# Patient Record
Sex: MALE | Race: WHITE | Employment: FULL TIME | URBAN - METROPOLITAN AREA
[De-identification: names, ages, dates, MRNs, and addresses within clinical notes are randomized per-mention and may not be internally consistent; named-entity substitution may affect disease eponyms.]

---

## 2018-06-18 ENCOUNTER — TRANSFERRED RECORDS (OUTPATIENT)
Dept: HEALTH INFORMATION MANAGEMENT | Facility: CLINIC | Age: 18
End: 2018-06-18

## 2018-08-09 ENCOUNTER — DOCUMENTATION ONLY (OUTPATIENT)
Dept: TRANSPLANT | Facility: CLINIC | Age: 18
End: 2018-08-09

## 2018-08-09 DIAGNOSIS — Z84.81 FAMILY HISTORY OF CARRIER OF GENETIC DISEASE: ICD-10-CM

## 2018-08-09 DIAGNOSIS — Z84.81 FAMILY HISTORY OF CARRIER OF GENETIC DISEASE: Primary | ICD-10-CM

## 2018-08-09 LAB — MISCELLANEOUS TEST: NORMAL

## 2018-08-09 NOTE — TELEPHONE ENCOUNTER
"It was a pleasure meeting with Rommel during his sister, Seema's, visit in the [facility] on August 9, 2018 to review Seema's genetic testing and discuss recommendations for follow up genetic studies for Seema's family members.    Family History:  A family history was not obtained today; however, Rommel's parents previously provided written permission to use Seema's test results and family history report for Rommel's care. Seema has also provided verbal assent for the use of her information.     Discussion:  We reviewed Seema's test results and the genetics and inheritance of FA-A. 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 Parental testing is needed to confirm that these variants are on separate copies of the FANCA gene (i.e. In trans).    Based on the autosomal recessive inheritance of FA-A, Rommel has a 1/4 (25%) chance of also having Fanconi anemia. Based on this information, testing is recommended to determine if Rommel has FA. This is in keeping with the recommendations for testing of full siblings provided in the \"Fanconi Anemia: Guidelines for Diagnosis and Management, Fourth Edition.\" Testing can be completed in one of two ways:    Chromosome Breakage Testing:     Chromosome breakage testing is the standard method for determining if someone has FA. We reviewed how this testing would determine if Rommel has FA or does not have FA. This testing would not determine if Rommel is a carrier for FA. We reviewed that Rommel's insurance does not have out-of-state benefits and that his insurance has indicated that testing would need to be completed at home for in-network benefits. They did say we could consider submitting a prior authorization, " however, testing (if approved) would be considered out-of-network and it is unclear what the out of pocket cost would be. We reviewed how I could write supporting information for Rommel to complete this testing locally and could be a supportive contact for his local provider when ordering testing, if this would be helpful. Alternatively, testing could be sent from our facility but we cannot provide the out of pocket cost. At this time, Rommel shared that chromosome breakage testing would not be his first choice for testing.    Genetic Testing:     We reviewed that genetic testing for Rommel would look for the two variants that were previously identified in Seema and are expected to be responsible for her diagnosis with FA. We reviewed how parental testing to verify that Seema's variants are on separate copies of the FANCA gene (i.e. In trans) would be preferable before completing testing. Testing in this method would (1) confirm Seema's results if parental testing confirms these variants are in trans, (2) determine if Rommel carries both FANCA gene variants which would lead to a diagnosis with FA, and (3) determine if Rommel is a carrier of a single FANCA gene variant meaning he is a carrier for FA or if he does not carry either familial variant. Rommel is aware that he currently has a 1/4 (25%) chance of having FA, a 1/2 (50%) chance of being a carrier for FA, and a 1/4 (25%) chance of not having FA and not being a carrier for the condition. Rommel is also aware that, if genetic testing determines that he has FA, Rommel would need to be followed and managed long term based on his underlying diagnosis.     Testing through our in-house laboratory would not be covered by insurance and as testing would be $1844 per individual at our institution, we discussed sending testing to [facility]. [facility] is a genetic testing laboratory and would perform full sequencing and deletion/duplication analysis of the FANCA gene for  Rommel's parents and for Rommel to determine carrier status. They can evaluate insurance coverage for testing on the family's behalf and call with the out of pocket cost. We also discussed how, if testing is not covered, there is a patient pay option of $250 per test. Alternatively, there is a reduced pay price list for patients submitting to insurance based on income. At the end of our discussion, Rommel and his parents consented to genetic testing through [facility].    Plan:  1. Rommel provided [facility] saliva [facility] today [facility] Rommel and his parents will be contacted directly by the laboratory regarding their expected out of pocket costs. The family will make a decision regarding coverage. If testing is not sufficiently covered, Rommel plans to contact me so I can help coordinate testing locally. If testing is completed through [facility], Rommel will be contacted by phone with results when available.  2. Rommel signed a release today permitting email communication of PHI and releasing his records from our institution to his local provider to assist with testing at home, as needed.  3. Contact information was provided. Additional questions or concerns were denied.    Sincerely,    Maria Dolores Huynh MS, [facility]  Certified Genetic Counselor  Pediatric Blood & Marrow Transplant  [phone]  [email or web address]    Approximate time spent in consultation: 40 minutes

## 2018-08-23 ENCOUNTER — TELEPHONE (OUTPATIENT)
Dept: TRANSPLANT | Facility: CLINIC | Age: 18
End: 2018-08-23

## 2018-08-30 NOTE — TELEPHONE ENCOUNTER
August 23, 2018    I called and spoke with Rommel along with his father, Nima, sharing that we have an update on insurance coverage for testing through [facility]. [facility] notified me that they were mistaken previously as the family's insurance is one of the only [facility] policies that is not in-network with their laboratory. Based on this information, they do not do pre-verification of benefits before testing; however, the laboratory reports the family should not be charged more than $100 out of pocket per test. Rommel expressed comfort with this plan and asked that we proceed with testing. The family will be contacted with results once available.    All questions answered. Additional concerns were denied.    Sincerely,    Maria Dolores Huynh MS, [facility]  Certified Genetic Counselor  [phone]  [email or web address]

## 2018-09-10 ENCOUNTER — TELEPHONE (OUTPATIENT)
Dept: TRANSPLANT | Facility: CLINIC | Age: 18
End: 2018-09-10

## 2018-09-11 NOTE — TELEPHONE ENCOUNTER
"September 10, 2018    I called with Dr. Jeovanny Stinson to review the results of Rommel's genetic testing. A voice message was left requesting a call back.    I called Rommel later in the day and spoke with Rommel regarding his genetic testing results from his testing through [facility]. We reviewed that he was found to have both of the FANCA mutations that were identified in his sister. In other words, Rommel is compound heterozygous for the c.2738A>C (p.[id number]) variant and the deletion of exons 1-14 in the FANCA gene. The testing laboratory, [facility], interprets both variants as \"pathogenic\" or disease-causing. We reviewed that these results are consistent with Rommel having Fanconi anemia (FA).     Rommel shared that this result was not what he expected. Rommel shared that he has been healthy overall and is surprised that he could have FA given his health history. We reviewed how the variant called p.[id number] has been seen in patients with a milder presentation of their FA symptoms (i.e. fewer congenital anomalies and later onset of hematologic symptoms) compared to the typical FA population and that this may help explain his current age and reportedly good health history. We also reviewed how different people with FA can present with different symptoms at different ages, even within families with the same genetic cause of their FA. Based on these results, we reviewed the importance of establishing a plan for screening and management for Rommel moving forward. We reviewed how this could be completed through our institution or through a local provider and that Dr. Stinson will be calling Rommel to discuss this process and timeline further. Rommel shared that this information is a lot to take in and asked that I call and review his results with his mother, Amy (Davida), so that she has a clear understanding of his results. We also made a plan to speak further next week to review Rommel's results in " greater detail when he has had more time to process his new diagnosis.    Finally, we discussed how important it is to have support after receiving this kind of news. I asked Rommel who in his personal life he can reach out to for support and he shared that he would call and speak with his parents. Rommel plans to reach out to his mother first and then I will call shortly after to discuss the results further with Davida. I shared that Rommel can also call or email me in the coming days if he has any questions and concerns and that I could also provide other support resources if this would be helpful. Rommel is also aware that he will be hearing from Dr. Stinson in the next day or two to discuss recommendations for medical follow up for Rommel at this point. Rommel expressed understanding and looks forward to that call.    We also discussed sending a results letter along with a copy of Rommel's results to Rommel directly for his review. We discussed how it would be helpful if we can share his test results and results letter with his local provider so he/she is aware of his diagnosis and can be involved in his care plans. Rommel shared that he has a primary care provider but he is not aware of his name. We will review this information again when we have a more detailed discussion next week.    All questions answered. Rommel shares that he still have my contact information. Additional concerns were denied.    Maria Dolores Huynh MS, [facility]  Certified Genetic Counselor  Pediatric Blood & Marrow Transplant  [phone]  [email or web address]    September 10, 2018    Per Rommel's request, I called and spoke with Rommel's mother, Amy (Davida), to review Rommel's results. Davida shared that Rommel called her earlier and left a voice message with his results. They have not yet connected to discuss the results in greater detail but Davida is aware that Rommel's results are consistent with Rommel also having  FA. We discussed how distressing and overwhelming this diagnosis is for the family and Davida shared that their biggest priority is getting Rommel's care started. She requested that Dr. Stinson call her as well to discuss screening and management going forward. She described how this process is more foreign to her since for her daughter who is under 18 years of age, all of the information so far has been communicated initial to Davida and her . She is aware that Rommel will be the primary contact for his care going forward but that he can decide what level of involvement from his parents would be most helpful and provide written permission for that level of communication. At this time, Davida plans to wait for Dr. Stinson's call as the majority of her questions center around timeline for establishing a management plan for Rommel and insurance evaluation for this process. Davida plans to call and speak with Rommel's father, Nima, regarding these results and they will reach out if they have further questions.    Maria Dolores Huynh MS, [facility]  Certified Genetic Counselor  Pediatric Blood & Marrow Transplant  [phone]  [email or web address]

## 2018-09-18 ENCOUNTER — TELEPHONE (OUTPATIENT)
Dept: TRANSPLANT | Facility: CLINIC | Age: 18
End: 2018-09-18

## 2018-09-18 NOTE — LETTER
"    September 18, 2018       TO: Rommel Howardarun  [address] [address]         Dear Rommel,    It was a pleasure speaking with you regarding the results of your genetic testing for Fanconi anemia (FA). Below is a summary of our discussion for your records:    FANCA Gene Analysis    Previously, you consented to genetic testing for FA through [facility] via FANCA sequencing and deletion/duplication analysis based on the results of your sister's genetic testing for FA. The results were as follows:    RESULTS: Heterozygous for (one copy of) the c.2738A>C (p.[id number]) variant and the deletion of exons 1-14 in the FANCA gene    Rommel, you were found to carry two variants in the FANCA gene called p.Qsq122Tln and the deletion of exons 1-14. [facility] has classified these changes as \"pathogenic\" or disease-causing while the original testing laboratory for your family, the [facility], interpreted these variants as \"likely pathogenic\" or likely disease-causing. Both laboratories are in agreement that these variants are responsible for FA based on the currently available evidence. The deletion of exons 1-14 has not been reported previously to our knowledge but deletions in the FANCA gene account for ~40% of FANCA gene variants associated with FA. The second variant, p.[id number], has been reported as a likely Sicilian  variant and has been describe as a possible milder FANCA variant resulting in later onset disease and a slower hematologic progression (Radha et al. 2018).     Taken together, these findings are most consistent with a diagnosis with FA-A (fanconi anemia from variants in the FANCA gene). A review of these results with Jeovanny Stinson MD, confirm that these findings are consistent with a diagnosis with FA and that follow up chromosome breakage studies for FA are recommended based on these findings. Based on these results, it is important that these " "results are reviewed with your primary care provider and other providers managing your care based on your underlying diagnosis of FA moving forward.  You are encouraged to stay in contact with our clinical team over time to learn if new information is available on the genetic findings in the family.     One variant, the deletion, was reported as \"possibly mosaic.\" This means that the deletion of exons 1-14 may have been present at a lower level than expected (35-55% compared to the expected 50%). We reviewed how the laboratory was not certain of this finding and how next generation sequencing is not validated for identifying mosaicism. We reviewed how the chromosome breakage assay can be completed at a testing laboratory that evaluates for mosaicism. While mosaicism in the peripheral blood is common in individuals with FA, mosaicism in other tissues cannot be completely ruled out without additional studies. Regardless, these findings are not expected to alter your genetic diagnosis with Fanconi anemia (FA).    Genetics and Inheritance of Fanconi anemia via the FANCA Gene    Fanconi anemia: FANCA    Variants in twenty-two genes have currently been associated with FA. Approximately 60-65% of individuals diagnosed with FA are expected to be in group FA-A (i.e. have variants in the FANCA gene) making FA-A the most common of the FA groups.     Carriers for FA-A    Some of the FA genes are associated with an increased risk of developing certain cancers in individuals who are carriers for a variant in the gene. Some evidence has shown a possible connection between being a carrier of a FANCA variant and having a risk for developing prostate cancer. As evidence is currently limited, current guidelines do not recommend additional screening for carriers of a FANCA variant but NCCN guidelines offer treatment options to be considered in individuals who have prostate cancer and are known carriers of a variant in the FANCA gene " (NCCN Guildelines Version 4.2018 Prostate Cancer). It is important that your relatives inform their primary care providers of this history so their medical care can be managed accordingly. As information on the FANCA gene is limited, it is important to reach out annually for updates as new information on FANCA may become available over time.    Reproductive Implications   Any biological child of an individual with FA-A would be a carrier for FA. In other words, 100% of your future children would be expected to be a carrier of FA from inheriting one of the variants identified in your FANCA gene. The chance that a future child of yours would have FA would be based on the carrier status of your future partner. Genetic counseling and partner carrier testing are available at any point in the future if you are interested.   If your future partner was found to be a carrier of FA from a variant in the FANCA gene, in each pregnancy together there would be a 1/2 (50%) chance of the pregnancy having FA and a 1/2 (50%) chance of the pregnancy being a carrier for FA. There are multiple reproductive options that are available to individuals with a known genetic condition and these options can be reviewed in greater detail at any point in the future if you are interested.   Both of your parents were found to be carriers of one of the variants identified in your FANCA gene. In each pregnancy for two carriers together, there is a 1/4 (25%) chance that the pregnancy would have FA, a 1/2 (50%) chance the pregnancy would be a carrier for FA, and a 1/4 (25%) chance the pregnancy would not be a carrier or be affected by FA.   Your extended family members also have an increased chance of being a carrier of FA. Genetic counseling and carrier testing are available for any relative who would like to learn more about what your test results mean for their personal and reproductive health. A local genetic counselor can be found through the  "\"Find a Genetic Counselor\" link on the [employer] website. Family members will need a copy of your test results to aid in this testing process. Ideally, paternal relatives will need a copy of your father's test results and your maternal relatives will need a copy of your mother's test results to aid in testing. A family letter to aid in sharing these test results with your extended family can also be provided at any time in the future.     It was a pleasure speaking with you regarding these results. Please find a copy of your test results enclosed for your records. The above information is based on our current understanding of the genetic findings in your family. You are encouraged to reach out annually for any updates to your family's genetic testing information as our understanding of the genetic findings in your family may change over time. If you have any additional questions or concerns, please do not hesitate to call me at [phone].    Sincerely,    Maria Dolores Huynh MS, [facility]  Certified Genetic Counselor  Pediatric Blood & Marrow Transplant  [phone]  [email or web address]    ENCLOSURE: Please include a copy of Rommel's results under the \"Laboratory\" tab titled \"send out misc\" from 8/9/2018.    CC Primary Care Provider:    Dr. Sadia Lawrence  [address] [place] [address]                                                                                                     "

## 2018-09-18 NOTE — Clinical Note
Attn: HIMS  Please print and send a copy of this letter and result to the patient at the home address.  Thank you!  Maria Dolores

## 2018-09-19 LAB — LAB SCANNED RESULT: NORMAL

## 2018-09-21 ENCOUNTER — TRANSFERRED RECORDS (OUTPATIENT)
Dept: HEALTH INFORMATION MANAGEMENT | Facility: CLINIC | Age: 18
End: 2018-09-21

## 2018-09-24 ENCOUNTER — MEDICAL CORRESPONDENCE (OUTPATIENT)
Dept: HEALTH INFORMATION MANAGEMENT | Facility: CLINIC | Age: 18
End: 2018-09-24

## 2018-09-24 ENCOUNTER — TRANSFERRED RECORDS (OUTPATIENT)
Dept: HEALTH INFORMATION MANAGEMENT | Facility: CLINIC | Age: 18
End: 2018-09-24

## 2018-09-28 ENCOUNTER — MEDICAL CORRESPONDENCE (OUTPATIENT)
Dept: TRANSPLANT | Facility: CLINIC | Age: 18
End: 2018-09-28

## 2018-09-30 ENCOUNTER — ANESTHESIA EVENT (OUTPATIENT)
Dept: SURGERY | Facility: CLINIC | Age: 18
End: 2018-09-30
Payer: COMMERCIAL

## 2018-10-01 ENCOUNTER — ANESTHESIA (OUTPATIENT)
Dept: SURGERY | Facility: CLINIC | Age: 18
End: 2018-10-01
Payer: COMMERCIAL

## 2018-10-01 ENCOUNTER — ALLIED HEALTH/NURSE VISIT (OUTPATIENT)
Dept: TRANSPLANT | Facility: CLINIC | Age: 18
End: 2018-10-01
Attending: GENETIC COUNSELOR, MS
Payer: COMMERCIAL

## 2018-10-01 ENCOUNTER — ALLIED HEALTH/NURSE VISIT (OUTPATIENT)
Dept: TRANSPLANT | Facility: CLINIC | Age: 18
End: 2018-10-01
Attending: PEDIATRICS
Payer: COMMERCIAL

## 2018-10-01 ENCOUNTER — SURGERY (OUTPATIENT)
Age: 18
End: 2018-10-01

## 2018-10-01 ENCOUNTER — ONCOLOGY VISIT (OUTPATIENT)
Dept: TRANSPLANT | Facility: CLINIC | Age: 18
End: 2018-10-01
Attending: NURSE PRACTITIONER
Payer: COMMERCIAL

## 2018-10-01 ENCOUNTER — HOSPITAL ENCOUNTER (OUTPATIENT)
Facility: CLINIC | Age: 18
Discharge: HOME | End: 2018-10-01
Attending: PEDIATRICS | Admitting: PEDIATRICS
Payer: COMMERCIAL

## 2018-10-01 VITALS
OXYGEN SATURATION: 99 % | HEART RATE: 81 BPM | TEMPERATURE: 98.3 F | RESPIRATION RATE: 18 BRPM | SYSTOLIC BLOOD PRESSURE: 128 MMHG | HEIGHT: 68 IN | BODY MASS INDEX: 18.38 KG/M2 | DIASTOLIC BLOOD PRESSURE: 76 MMHG | WEIGHT: 121.25 LBS

## 2018-10-01 VITALS
DIASTOLIC BLOOD PRESSURE: 83 MMHG | HEART RATE: 69 BPM | OXYGEN SATURATION: 98 % | TEMPERATURE: 97.3 F | RESPIRATION RATE: 16 BRPM | SYSTOLIC BLOOD PRESSURE: 123 MMHG | BODY MASS INDEX: 18.48 KG/M2 | HEIGHT: 68 IN | WEIGHT: 121.91 LBS

## 2018-10-01 DIAGNOSIS — D61.03 FANCONI'S ANEMIA: Primary | ICD-10-CM

## 2018-10-01 DIAGNOSIS — Z71.9 ENCOUNTER FOR COUNSELING: Primary | ICD-10-CM

## 2018-10-01 DIAGNOSIS — Z84.81 FAMILY HISTORY OF CARRIER OF GENETIC DISEASE: ICD-10-CM

## 2018-10-01 DIAGNOSIS — D61.03 FANCONI'S ANEMIA: ICD-10-CM

## 2018-10-01 DIAGNOSIS — G89.18 POST-OP PAIN: ICD-10-CM

## 2018-10-01 DIAGNOSIS — D61.03 ANEMIA, FANCONI: Primary | ICD-10-CM

## 2018-10-01 LAB — GLUCOSE BLDC GLUCOMTR-MCNC: 90 MG/DL (ref 70–99)

## 2018-10-01 PROCEDURE — 88271 CYTOGENETICS DNA PROBE: CPT | Performed by: NURSE PRACTITIONER

## 2018-10-01 PROCEDURE — 40000951 ZZHCL STATISTIC BONE MARROW INTERP TC 85097: Performed by: NURSE PRACTITIONER

## 2018-10-01 PROCEDURE — 88311 DECALCIFY TISSUE: CPT | Performed by: NURSE PRACTITIONER

## 2018-10-01 PROCEDURE — 25000132 ZZH RX MED GY IP 250 OP 250 PS 637: Performed by: ANESTHESIOLOGY

## 2018-10-01 PROCEDURE — 88161 CYTOPATH SMEAR OTHER SOURCE: CPT | Performed by: NURSE PRACTITIONER

## 2018-10-01 PROCEDURE — G0463 HOSPITAL OUTPT CLINIC VISIT: HCPCS | Mod: ZF

## 2018-10-01 PROCEDURE — 88305 TISSUE EXAM BY PATHOLOGIST: CPT | Performed by: NURSE PRACTITIONER

## 2018-10-01 PROCEDURE — 71000027 ZZH RECOVERY PHASE 2 EACH 15 MINS: Performed by: NURSE PRACTITIONER

## 2018-10-01 PROCEDURE — 37000009 ZZH ANESTHESIA TECHNICAL FEE, EACH ADDTL 15 MIN: Performed by: NURSE PRACTITIONER

## 2018-10-01 PROCEDURE — 25000125 ZZHC RX 250: Performed by: NURSE ANESTHETIST, CERTIFIED REGISTERED

## 2018-10-01 PROCEDURE — 36000051 ZZH SURGERY LEVEL 2 1ST 30 MIN - UMMC: Performed by: NURSE PRACTITIONER

## 2018-10-01 PROCEDURE — 40000268 ZZH STATISTIC NO CHARGES: Mod: ZF

## 2018-10-01 PROCEDURE — 40000564 ZZHCL STATISTIC BONE MARROW CORE PERF TC ACL/CSC 38221: Performed by: NURSE PRACTITIONER

## 2018-10-01 PROCEDURE — 88237 TISSUE CULTURE BONE MARROW: CPT | Performed by: NURSE PRACTITIONER

## 2018-10-01 PROCEDURE — 88313 SPECIAL STAINS GROUP 2: CPT | Performed by: NURSE PRACTITIONER

## 2018-10-01 PROCEDURE — 88280 CHROMOSOME KARYOTYPE STUDY: CPT | Performed by: NURSE PRACTITIONER

## 2018-10-01 PROCEDURE — 88185 FLOWCYTOMETRY/TC ADD-ON: CPT | Performed by: NURSE PRACTITIONER

## 2018-10-01 PROCEDURE — 88249 CHROMOSOME ANALYSIS 100: CPT | Performed by: NURSE PRACTITIONER

## 2018-10-01 PROCEDURE — 25000125 ZZHC RX 250: Performed by: NURSE PRACTITIONER

## 2018-10-01 PROCEDURE — 40000611 ZZHCL STATISTIC MORPHOLOGY W/INTERP HEMEPATH TC 85060: Performed by: NURSE PRACTITIONER

## 2018-10-01 PROCEDURE — 88275 CYTOGENETICS 100-300: CPT | Mod: 91 | Performed by: NURSE PRACTITIONER

## 2018-10-01 PROCEDURE — 85027 COMPLETE CBC AUTOMATED: CPT | Performed by: NURSE PRACTITIONER

## 2018-10-01 PROCEDURE — 88264 CHROMOSOME ANALYSIS 20-25: CPT | Performed by: NURSE PRACTITIONER

## 2018-10-01 PROCEDURE — 88342 IMHCHEM/IMCYTCHM 1ST ANTB: CPT | Performed by: NURSE PRACTITIONER

## 2018-10-01 PROCEDURE — 00000161 ZZHCL STATISTIC H-SPHEME PROCESS B/S: Performed by: NURSE PRACTITIONER

## 2018-10-01 PROCEDURE — 40000170 ZZH STATISTIC PRE-PROCEDURE ASSESSMENT II: Performed by: NURSE PRACTITIONER

## 2018-10-01 PROCEDURE — 25000128 H RX IP 250 OP 636: Performed by: NURSE ANESTHETIST, CERTIFIED REGISTERED

## 2018-10-01 PROCEDURE — 40001005 ZZHCL STATISTIC FLOW >15 ABY TC 88189: Performed by: NURSE PRACTITIONER

## 2018-10-01 PROCEDURE — 37000008 ZZH ANESTHESIA TECHNICAL FEE, 1ST 30 MIN: Performed by: NURSE PRACTITIONER

## 2018-10-01 PROCEDURE — 40000567 ZZHCL STATISTIC BONE MARROW ASP PERF TC ACL/CSC 38220: Performed by: NURSE PRACTITIONER

## 2018-10-01 PROCEDURE — 88230 TISSUE CULTURE LYMPHOCYTE: CPT | Performed by: NURSE PRACTITIONER

## 2018-10-01 PROCEDURE — 88341 IMHCHEM/IMCYTCHM EA ADD ANTB: CPT | Performed by: NURSE PRACTITIONER

## 2018-10-01 PROCEDURE — 82962 GLUCOSE BLOOD TEST: CPT

## 2018-10-01 PROCEDURE — 40000072 ZZH STATISTIC GENETIC COUNSELING, < 16 MIN: Mod: ZF | Performed by: GENETIC COUNSELOR, MS

## 2018-10-01 PROCEDURE — 88184 FLOWCYTOMETRY/ TC 1 MARKER: CPT | Performed by: NURSE PRACTITIONER

## 2018-10-01 PROCEDURE — 25000128 H RX IP 250 OP 636: Performed by: ANESTHESIOLOGY

## 2018-10-01 RX ORDER — LIDOCAINE HYDROCHLORIDE 20 MG/ML
INJECTION, SOLUTION INFILTRATION; PERINEURAL PRN
Status: DISCONTINUED | OUTPATIENT
Start: 2018-10-01 | End: 2018-10-01

## 2018-10-01 RX ORDER — FENTANYL CITRATE 50 UG/ML
25 INJECTION, SOLUTION INTRAMUSCULAR; INTRAVENOUS EVERY 10 MIN PRN
Status: DISCONTINUED | OUTPATIENT
Start: 2018-10-01 | End: 2018-10-01 | Stop reason: HOSPADM

## 2018-10-01 RX ORDER — LIDOCAINE HYDROCHLORIDE 10 MG/ML
INJECTION, SOLUTION EPIDURAL; INFILTRATION; INTRACAUDAL; PERINEURAL PRN
Status: DISCONTINUED | OUTPATIENT
Start: 2018-10-01 | End: 2018-10-01 | Stop reason: HOSPADM

## 2018-10-01 RX ORDER — SODIUM CHLORIDE, SODIUM LACTATE, POTASSIUM CHLORIDE, CALCIUM CHLORIDE 600; 310; 30; 20 MG/100ML; MG/100ML; MG/100ML; MG/100ML
INJECTION, SOLUTION INTRAVENOUS CONTINUOUS PRN
Status: DISCONTINUED | OUTPATIENT
Start: 2018-10-01 | End: 2018-10-01

## 2018-10-01 RX ORDER — DEXAMETHASONE SODIUM PHOSPHATE 4 MG/ML
INJECTION, SOLUTION INTRA-ARTICULAR; INTRALESIONAL; INTRAMUSCULAR; INTRAVENOUS; SOFT TISSUE PRN
Status: DISCONTINUED | OUTPATIENT
Start: 2018-10-01 | End: 2018-10-01

## 2018-10-01 RX ORDER — ACETAMINOPHEN 325 MG/1
650 TABLET ORAL
Status: DISCONTINUED | OUTPATIENT
Start: 2018-10-01 | End: 2018-10-01 | Stop reason: HOSPADM

## 2018-10-01 RX ORDER — PROPOFOL 10 MG/ML
INJECTION, EMULSION INTRAVENOUS PRN
Status: DISCONTINUED | OUTPATIENT
Start: 2018-10-01 | End: 2018-10-01

## 2018-10-01 RX ORDER — ONDANSETRON 2 MG/ML
INJECTION INTRAMUSCULAR; INTRAVENOUS PRN
Status: DISCONTINUED | OUTPATIENT
Start: 2018-10-01 | End: 2018-10-01

## 2018-10-01 RX ORDER — FENTANYL CITRATE 50 UG/ML
INJECTION, SOLUTION INTRAMUSCULAR; INTRAVENOUS PRN
Status: DISCONTINUED | OUTPATIENT
Start: 2018-10-01 | End: 2018-10-01

## 2018-10-01 RX ORDER — PROPOFOL 10 MG/ML
INJECTION, EMULSION INTRAVENOUS CONTINUOUS PRN
Status: DISCONTINUED | OUTPATIENT
Start: 2018-10-01 | End: 2018-10-01

## 2018-10-01 RX ADMIN — PROPOFOL 100 MG: 10 INJECTION, EMULSION INTRAVENOUS at 13:41

## 2018-10-01 RX ADMIN — LIDOCAINE HYDROCHLORIDE 2 ML: 10 INJECTION, SOLUTION EPIDURAL; INFILTRATION; INTRACAUDAL; PERINEURAL at 13:47

## 2018-10-01 RX ADMIN — SODIUM CHLORIDE, POTASSIUM CHLORIDE, SODIUM LACTATE AND CALCIUM CHLORIDE 500 ML: 600; 310; 30; 20 INJECTION, SOLUTION INTRAVENOUS at 14:38

## 2018-10-01 RX ADMIN — FENTANYL CITRATE 50 MCG: 50 INJECTION, SOLUTION INTRAMUSCULAR; INTRAVENOUS at 13:43

## 2018-10-01 RX ADMIN — ONDANSETRON 4 MG: 2 INJECTION INTRAMUSCULAR; INTRAVENOUS at 13:45

## 2018-10-01 RX ADMIN — SODIUM CHLORIDE, POTASSIUM CHLORIDE, SODIUM LACTATE AND CALCIUM CHLORIDE: 600; 310; 30; 20 INJECTION, SOLUTION INTRAVENOUS at 13:35

## 2018-10-01 RX ADMIN — PROPOFOL 300 MCG/KG/MIN: 10 INJECTION, EMULSION INTRAVENOUS at 13:41

## 2018-10-01 RX ADMIN — ACETAMINOPHEN 650 MG: 325 TABLET, FILM COATED ORAL at 14:45

## 2018-10-01 RX ADMIN — DEXAMETHASONE SODIUM PHOSPHATE 6 MG: 4 INJECTION, SOLUTION INTRAMUSCULAR; INTRAVENOUS at 13:45

## 2018-10-01 RX ADMIN — PROPOFOL 50 MG: 10 INJECTION, EMULSION INTRAVENOUS at 13:42

## 2018-10-01 RX ADMIN — FENTANYL CITRATE 50 MCG: 50 INJECTION, SOLUTION INTRAMUSCULAR; INTRAVENOUS at 13:47

## 2018-10-01 RX ADMIN — MIDAZOLAM 2 MG: 1 INJECTION INTRAMUSCULAR; INTRAVENOUS at 13:35

## 2018-10-01 RX ADMIN — PROPOFOL 50 MG: 10 INJECTION, EMULSION INTRAVENOUS at 13:48

## 2018-10-01 RX ADMIN — LIDOCAINE HYDROCHLORIDE 60 MG: 20 INJECTION, SOLUTION INFILTRATION; PERINEURAL at 13:40

## 2018-10-01 ASSESSMENT — PAIN SCALES - GENERAL: PAINLEVEL: NO PAIN (0)

## 2018-10-01 NOTE — PROGRESS NOTES
"Lengthy visit with Rommel, his mom and his younger sister, along with Maile Arellano, CFL Specialist. Conversation focused on\" coping with recent news that Rommel tested positive for Fanconi anemia; coping with uncertainty; preparation for his initial bone marrow biopsy.  "

## 2018-10-01 NOTE — PROCEDURES
BMT Bone Marrow Biopsy Procedure Note  October 1, 2018 2:26 PM    DIAGNOSIS: Fanconi Anemia     PROCEDURE: Unilateral Bone Marrow Biopsy and Unilateral Aspirate    SITE: Operating Room    Patient s identification was positively verified by patient identification band and invasive procedure safety checklist was completed.  Informed consent was obtained. Following the administration of propofol as sedation, patient was placed in the  left lateral decubitus position and prepped and draped in a sterile manner.  Approximately 2 cc of 1% Lidocaine was used over the right posterior iliac spine.  Following this a 3 mm incision was made. Trephine bone marrow core was obtained from the [facility]. Bone marrow aspirates were obtained from the [facility]. Aspirates were sent for pathology, chromosomes, FISH, flow for leukemia/lymphoma and research to Dr. Stinson's lab.  A total of approximately 20 ml of marrow was aspirated.  Following this procedure a sterile dressing was applied to the bone marrow biopsy site(s). The patient was placed in the supine position to maintain pressure on the biopsy site. Post-procedure wound care instructions were given. The patient tolerated the procedure well with no apparent discomfort.  Complications: None    Procedure performed by:   Shannon J. Schroetter, CPNP-CASSIUS  Pediatric Blood and Marrow Transplant Program  [facility]  Pager: [phone]  [facility] Phone: [phone]

## 2018-10-01 NOTE — ANESTHESIA CARE TRANSFER NOTE
Patient: Rommel Murphy    Procedure(s):  Bone Marrow Biopsy     Diagnosis: Fanconi Anemia   Diagnosis Additional Information: No value filed.    Anesthesia Type:   MAC     Note:  Airway :Nasal Cannula  Patient transferred to:Phase II  Comments: Still sleepy, stable, nasal cannula oxygen, maintains airway and sats, report to phase 2 recovery RN.  Dr. Cisneros notified of patients status.Handoff Report: Identifed the Patient, Identified the Reponsible Provider, Reviewed the pertinent medical history, Discussed the surgical course, Reviewed Intra-OP anesthesia mangement and issues during anesthesia, Set expectations for post-procedure period and Allowed opportunity for questions and acknowledgement of understanding      Vitals: (Last set prior to Anesthesia Care Transfer)    CR[facility] VITALS  10/1/2018 1332 - 10/1/2018 1412      10/1/2018             Pulse: 55    SpO2: 99 %                Electronically Signed By: TIESHA Echevarria CRNA  October 1, 2018  2:12 PM

## 2018-10-01 NOTE — MR AVS SNAPSHOT
After Visit Summary   10/1/2018    Rommel Murphy    MRN: [id number]           Patient Information     Date Of Birth          2000        Visit Information        Provider Department      10/1/2018 1:00 PM Maria Dolores Huynh GC Peds Blood and Marrow Transplant        Today's Diagnoses     Fanconi's anemia (H)              [facility], 9th floor  [address] [place] [address]  Phone: [phone]  Clinic Hours:   Monday-Friday:   7 am to 5:00 pm   closed weekends and major  holidays     If your fever is 100.5  or greater,   call the clinic during business hours.   After hours call [phone] and ask for the pediatric BMT physician to be paged for you.              Care Instructions    No follow up instructions as of 10/2/2018 at 12:07pm SLL          Follow-ups after your visit        Who to contact     Please call your clinic at [phone] to:    Ask questions about your health    Make or cancel appointments    Discuss your medicines    Learn about your test results    Speak to your doctor            Additional Information About Your Visit        [employer] Information     [employer] is an electronic gateway that provides easy, online access to your medical records. With [employer] you can request a clinic appointment, read your test results, renew a prescription or communicate with your care team.     To sign up for [employer] visit the website at [email or web address]   You will be asked to enter the access code listed below, as well as some personal information. Please follow the directions to create your username and password.     Your access code is: [email or web address]  Expires: 2018  2:41 PM     Your access code will  in 90 days. If you need help or a new code, please contact your [facility] or call [phone] for assistance.        Care EveryWhere ID     This is your Care EveryWhere ID. This could  be used by other organizations to access your [place] medical records  [id number]         Blood Pressure from Last 3 Encounters:   10/01/18 123/83   10/01/18 128/76    Weight from Last 3 Encounters:   10/01/18 55.3 kg (121 lb 14.6 oz)   10/01/18 55 kg (121 lb 4.1 oz)              We Performed the Following     Glucose by meter          Today's Medication Changes      Notice     This visit is during an admission. Changes to the med list made in this visit will be reflected in the After Visit Summary of the admission.             Primary Care Provider Office Phone # Fax #    Christiano Hammond [phone]       PRIMARY CARE [facility] [address] [place] [address]        Equal Access to Services     JORGE L ZAVALA : Hadii chelly becko Soelaine, waaxda luqadaha, qaybta kaalmada adeegyada, jaja mcdonnell . So [facility] [phone].    ATENCIÓN: Si habla español, tiene a griffin disposición servicios gratuitos de asistencia lingüística. [facility] [phone].    We comply with applicable federal civil rights laws and [place] laws. We do not discriminate on the basis of race, color, national origin, age, disability, sex, sexual orientation, or gender identity.            Thank you!     [facility] you for choosing [facility]  for your care. Our goal is always to provide you with excellent care. Hearing back from our patients is one way we can continue to improve our services. Please take a few minutes to complete the written survey that you may receive in the mail after your visit with us. Thank you!             Your Updated Medication List - Protect others around you: Learn how to safely use, store and throw away your medicines at www.disposemymeds.org.      Notice     This visit is during an admission. Changes to the med list made in this visit will be reflected in the After Visit Summary of the admission.

## 2018-10-01 NOTE — PROGRESS NOTES
Patient not seen in clinic today, encounter is for procedure completed in OR. Please see procedure note in OR encounter dated October 1, 2018 for details of this visit.    Shannon J. Schroetter, CPNP-  Pediatric Blood and Marrow Transplant Program  [facility]  Pager: [phone]  [facility] Phone: [phone]

## 2018-10-01 NOTE — ANESTHESIA PREPROCEDURE EVALUATION
"  Anesthesia Evaluation     .      No history of anesthetic complications (No known family hx of anesthetic complications)          ROS/MED HX    ENT/Pulmonary:  - neg pulmonary ROS     Neurologic:  - neg neurologic ROS     Cardiovascular:  - neg cardiovascular ROS       METS/Exercise Tolerance:  >4 METS   Hematologic: Comments:   - Positive genetic testing for fanconi anemia. Mild leukopenia.  Hgb 13.2  Plt 185          Musculoskeletal:  - neg musculoskeletal ROS       GI/Hepatic:  - neg GI/hepatic ROS      (-) GERD   Renal/Genitourinary:  - ROS Renal section negative       Endo:  - neg endo ROS       Psychiatric:  - neg psychiatric ROS       Infectious Disease:  - neg infectious disease ROS       Malignancy:      - no malignancy   Other:    - neg other ROS               Procedure: Procedure(s):  Bone Marrow Biopsy       PMHx/PSHx:  No past medical history on file.    No past surgical history on file.      No current facility-administered medications on file prior to encounter.   No current outpatient prescriptions on file prior to encounter.      PCP: Christiano Hammond    No results found for: WBC, HGB, HCT, PLT, CRP, SED, NA, POTASSIUM, CHLORIDE, CO2, BUN, CR, GLC, MIKO, PHOS, MAG, ALBUMIN, PROTTOTAL, ALT, AST, GGT, ALKPHOS, BILITOTAL, BILIDIRECT, LIPASE, AMYLASE, AUREA, PTT, INR, FIBR, TSH, T4, T3, HCG, HCGS, CKTOTAL, CKMB, TROPN      Preop Vitals  BP Readings from Last 3 Encounters:   10/01/18 121/82   10/01/18 128/76    Pulse Readings from Last 3 Encounters:   10/01/18 69   10/01/18 81      Resp Readings from Last 3 Encounters:   10/01/18 18   10/01/18 18    SpO2 Readings from Last 3 Encounters:   10/01/18 99%   10/01/18 99%      Temp Readings from Last 3 Encounters:   10/01/18 36.3  C (97.3  F) (Oral)   10/01/18 36.8  C (98.3  F) (Oral)    Ht Readings from Last 3 Encounters:   10/01/18 1.721 m (5' 7.76\")   10/01/18 1.721 m (5' 7.76\")      Wt Readings from Last 3 Encounters:   10/01/18 55.3 kg (121 lb 14.6 oz) " "  10/01/18 55 kg (121 lb 4.1 oz)    Estimated body mass index is 18.67 kg/(m^2) as calculated from the following:    Height as of this encounter: 1.721 m (5' 7.76\").    Weight as of this encounter: 55.3 kg (121 lb 14.6 oz).     Scheduled Medications      Infusions      LDA       Physical Exam  Normal systems: dental    Airway   Mallampati: I  TM distance: >3 FB  Neck ROM: full    Dental     Cardiovascular   Rhythm and rate: regular and normal      Pulmonary    breath sounds clear to auscultation                    Anesthesia Plan      History & Physical Review  History and physical reviewed and following examination; no interval change.    ASA Status:  2 .    NPO Status:  > 6 hours    Plan for MAC with Intravenous and Propofol induction. Maintenance will be TIVA.    PONV prophylaxis:  Ondansetron (or other 5HT-3) and Dexamethasone or Solumedrol    - Natural airway with LMA/ETT backup  - TIVA with propofol infusion  - Relevant risks, benefits, alternatives and the anesthetic plan were discussed with patient/family or family representative.  All questions were answered and there was agreement to proceed.        Postoperative Care  Postoperative pain management:  IV analgesics and Multi-modal analgesia.      Consents  Anesthetic plan, risks, benefits and alternatives discussed with:  Patient..        Emi Cisneros MD  Staff Pediatric Anesthesiologist  [phone]    1:02 PM  October 1, 2018    "

## 2018-10-01 NOTE — NURSING NOTE
"Chief Complaint   Patient presents with     New Patient     New patient here today for consult for Fanconi Anemia     /76 (BP Location: Right arm, Patient Position: Fowlers, Cuff Size: Adult Regular)  Pulse 81  Temp 98.3  F (36.8  C) (Oral)  Resp 18  Ht 1.721 m (5' 7.76\")  Wt 55 kg (121 lb 4.1 oz)  SpO2 99%  BMI 18.57 kg/m2  Missy Rubio, [facility]  October 1, 2018    "

## 2018-10-01 NOTE — MR AVS SNAPSHOT
After Visit Summary   10/1/2018    Rommel Murphy    MRN: [id number]           Patient Information     Date Of Birth          2000        Visit Information        Provider Department      10/1/2018 9:00 AM [facility] PEDS BMT NURSE COORDINATOR Peds Blood and Marrow Transplant        Today's Diagnoses     Fanconi's anemia (H)    -  1    Family history of carrier of genetic disease              [facility], 9th floor  [address] [place] [address]  Phone: [phone]  Clinic Hours:   Monday-Friday:   7 am to 5:00 pm   closed weekends and major  holidays     If your fever is 100.5  or greater,   call the clinic during business hours.   After hours call [phone] and ask for the pediatric BMT physician to be paged for you.               Follow-ups after your visit        Who to contact     Please call your clinic at [phone] to:    Ask questions about your health    Make or cancel appointments    Discuss your medicines    Learn about your test results    Speak to your doctor            Additional Information About Your Visit        MyChart Information     [employer] is an electronic gateway that provides easy, online access to your medical records. With [employer], you can request a clinic appointment, read your test results, renew a prescription or communicate with your care team.     To sign up for [employer] visit the website at [email or web address]   You will be asked to enter the access code listed below, as well as some personal information. Please follow the directions to create your username and password.     Your access code is: [email or web address]  Expires: 2018  2:41 PM     Your access code will  in 90 days. If you need help or a new code, please contact your [facility] or call [phone] for assistance.        Care EveryWhere ID     This is your Care EveryWhere ID. This could be used by other  organizations to access your [place] medical records  [id number]         Blood Pressure from Last 3 Encounters:   10/01/18 119/89   10/01/18 128/76    Weight from Last 3 Encounters:   10/01/18 55.3 kg (121 lb 14.6 oz)   10/01/18 55 kg (121 lb 4.1 oz)              Today, you had the following     No orders found for display       Primary Care Provider Office Phone # Fax #    Christiano Hammond [phone]       PRIMARY CARE [facility] [address] [place] [address]        Equal Access to Services     [facility]: Hadii aad ku hadasho Soomaali, waaxda luqadaha, qaybta kaalmada aderahelyafanny, jaja mcdonnell . So [facility] [phone].    ATENCIÓN: Si habla español, tiene a griffin disposición servicios gratuitos de asistencia lingüística. [facility] [phone].    We comply with applicable federal civil rights laws and [place] laws. We do not discriminate on the basis of race, color, national origin, age, disability, sex, sexual orientation, or gender identity.            Thank you!     Thank you for choosing [facility]  for your care. Our goal is always to provide you with excellent care. Hearing back from our patients is one way we can continue to improve our services. Please take a few minutes to complete the written survey that you may receive in the mail after your visit with us. Thank you!             Your Updated Medication List - Protect others around you: Learn how to safely use, store and throw away your medicines at www.disposemymeds.org.      Notice  As of 10/1/2018 12:30 PM    You have not been prescribed any medications.

## 2018-10-01 NOTE — IP AVS SNAPSHOT
UR PREOP/PHASE II    [address] [place] [address]    Phone:  [phone]                                       After Visit Summary   10/1/2018    Rommel Murphy    MRN: [id number]           After Visit Summary Signature Page     I have received my discharge instructions, and my questions have been answered. I have discussed any challenges I see with this plan with the nurse or doctor.    ..........................................................................................................................................  Patient/Patient Representative Signature      ..........................................................................................................................................  Patient Representative Print Name and Relationship to Patient    ..................................................               ................................................  Date                                   Time    ..........................................................................................................................................  Reviewed by Signature/Title    ...................................................              ..............................................  Date                                               Time          22EPIC Rev 08/18

## 2018-10-01 NOTE — DISCHARGE INSTRUCTIONS
[facility]  [phone]    Care for Bone Marrow Biopsy    Do not remove bandage/dressing for 24 hours -- after this time they can be removed. If Steri-strips are presents they can stay on until they fall off    No bath, shower or soaking of the dressing for 24 hours    Activity as tolerated by the patient    Diet as able to tolerate    May use Tylenol as needed for pain control    Can apply icepack to the site for discomfort -- no more than 10 minutes at a time    If bleeding presents, apply pressure for 5 minutes    Call [phone] ask for Peds BMT/Hem/Onc fellow on call if complications arise including:     persistent bleeding    fever greater than 100.5    pain     Same-Day Surgery   Adult Discharge Orders & Instructions     For 24 hours after surgery:  1. Get plenty of rest.  A responsible adult must stay with you for at least 24 hours after you leave the hospital.   2. Pain medication can slow your reflexes. Do not drive or use heavy equipment.  If you have weakness or tingling, don't drive or use heavy equipment until this feeling goes away.  3. Mixing alcohol and pain medication can cause dizziness and slow your breathing. It can even be fatal. Do not drink alcohol while taking pain medication.  4. Avoid strenuous or risky activities.  Ask for help when climbing stairs.   5. You may feel lightheaded.  If so, sit for a few minutes before standing.  Have someone help you get up.   6. If you have nausea (feel sick to your stomach), drink only clear liquids such as apple juice, ginger ale, broth or 7-Up.  Rest may also help.  Be sure to drink enough fluids.  Move to a regular diet as you feel able. Take pain medications with a small amount of solid food, such as toast or crackers, to avoid nausea.   7. A slight fever is normal. Call the doctor if your fever is over 100 F (37.7 C) (taken under the tongue) or lasts longer than 24 hours.  8. You may have a dry mouth, muscle aches, trouble sleeping or a sore  throat.  These symptoms should go away after 24 hours.  9. Do not make important or legal decisions.   Pain Management:      1. Take pain medication (if prescribed) for pain as directed by your physician.        2. WARNING: If the pain medication you have been prescribed contains Tylenol  (acetaminophen), DO NOT take additional doses of Tylenol (acetaminophen).     Call your doctor for any of the followin.  Signs of infection (fever, growing tenderness at the surgery site, severe pain, a large amount of drainage or bleeding, foul-smelling drainage, redness, swelling).    2.  It has been over 8 to 10 hours since surgery and you are still not able to urinate (pee).    3.  Headache for over 24 hours.    4.  Numbness, tingling or weakness the day after surgery (if you had spinal anesthesia).  To contact a doctor, call _____________________________________ or:      [phone] and ask for the Resident On Call for:          __________________________________________ (answered 24 hours a day)      Emergency Department:  [facility]: [phone]  [facility]: [phone]               Rev. 10/2014

## 2018-10-01 NOTE — MR AVS SNAPSHOT
After Visit Summary   10/1/2018    Rommel Murphy    MRN: [id number]           Patient Information     Date Of Birth          2000        Visit Information        Provider Department      10/1/2018 9:00 AM Jeovanny Stinson MD Peds Blood and Marrow Transplant        Today's Diagnoses     Fanconi's anemia (H)    -  1          [facility], 9th floor  [address] [place] [address]  Phone: [phone]  Clinic Hours:   Monday-Friday:   7 am to 5:00 pm   closed weekends and major  holidays     If your fever is 100.5  or greater,   call the clinic during business hours.   After hours call [phone] and ask for the pediatric BMT physician to be paged for you.              Care Instructions    No follow up instructions as of 10/2/2018 at 9:05am SLL          Follow-ups after your visit        Who to contact     Please call your clinic at [phone] to:    Ask questions about your health    Make or cancel appointments    Discuss your medicines    Learn about your test results    Speak to your doctor            Additional Information About Your Visit        [employer] Information     [employer] is an electronic gateway that provides easy, online access to your medical records. With [employer], you can request a clinic appointment, read your test results, renew a prescription or communicate with your care team.     To sign up for [employer] visit the website at [email or web address]   You will be asked to enter the access code listed below, as well as some personal information. Please follow the directions to create your username and password.     Your access code is: [email or web address]  Expires: 2018  2:41 PM     Your access code will  in 90 days. If you need help or a new code, please contact your [facility] or call [phone] for assistance.        Care EveryWhere ID     This is your Care EveryWhere ID. This  "could be used by other organizations to access your [place] medical records  [id number]        Your Vitals Were     Pulse Temperature Respirations Height Pulse Oximetry BMI (Body Mass Index)    81 98.3  F (36.8  C) (Oral) 18 1.721 m (5' 7.76\") 99% 18.57 kg/m2       Blood Pressure from Last 3 Encounters:   10/01/18 123/83   10/01/18 128/76    Weight from Last 3 Encounters:   10/01/18 55.3 kg (121 lb 14.6 oz)   10/01/18 55 kg (121 lb 4.1 oz)              Today, you had the following     No orders found for display       Primary Care Provider Office Phone # Fax #    Christiano Hammond [phone]       PRIMARY CARE [facility] [address] [place] [address]        Equal Access to Services     [facility] : Hadii chelly fitzgerald Soelaine, waaxda luqadaha, qaybta kaalmada paula, jaja mcdonnell . So [facility] [phone].    ATENCIÓN: Si habla español, tiene a griffin disposición servicios gratuitos de asistencia lingüística. Arley al [phone].    We comply with applicable federal civil rights laws and [place] laws. We do not discriminate on the basis of race, color, national origin, age, disability, sex, sexual orientation, or gender identity.            Thank you!     Thank you for choosing [facility]PL[facility]  for your care. Our goal is always to provide you with excellent care. Hearing back from our patients is one way we can continue to improve our services. Please take a few minutes to complete the written survey that you may receive in the mail after your visit with us. Thank you!             Your Updated Medication List - Protect others around you: Learn how to safely use, store and throw away your medicines at www.disposemymeds.org.      Notice  As of 10/1/2018 12:30 PM    You have not been prescribed any medications.      "

## 2018-10-01 NOTE — IP AVS SNAPSHOT
MRN:[id number]                      After Visit Summary   10/1/2018    Rommel Murphy    MRN: [id number]           Thank you!     Thank you for choosing [facility] for your care. Our goal is always to provide you with excellent care. Hearing back from our patients is one way we can continue to improve our services. Please take a few minutes to complete the written survey that you may receive in the mail after you visit with us. Thank you!        Patient Information     Date Of Birth          2000        About your hospital stay     You were admitted on:  October 1, 2018 You last received care in the:  UR PREOP/PHASE II    You were discharged on:  October 1, 2018       Who to Call     For medical emergencies, please call 911.  For non-urgent questions about your medical care, please call your primary care provider or clinic, [phone]  For questions related to your surgery, please call your surgery clinic        Attending Provider     Provider Specialty    Jeovanny Stinson MD Pediatrics       Primary Care Provider Office Phone # Fax #    Christiano Hammond [phone]      Further instructions from your care team         [facility]  [phone]    Care for Bone Marrow Biopsy    Do not remove bandage/dressing for 24 hours -- after this time they can be removed. If Steri-strips are presents they can stay on until they fall off    No bath, shower or soaking of the dressing for 24 hours    Activity as tolerated by the patient    Diet as able to tolerate    May use Tylenol as needed for pain control    Can apply icepack to the site for discomfort -- no more than 10 minutes at a time    If bleeding presents, apply pressure for 5 minutes    Call [phone] ask for Peds BMT/Hem/Onc fellow on call if complications arise including:     persistent bleeding    fever greater than 100.5    pain     Same-Day Surgery   Adult Discharge Orders & Instructions     For 24 hours after  surgery:  1. Get plenty of rest.  A responsible adult must stay with you for at least 24 hours after you leave the hospital.   2. Pain medication can slow your reflexes. Do not drive or use heavy equipment.  If you have weakness or tingling, don't drive or use heavy equipment until this feeling goes away.  3. Mixing alcohol and pain medication can cause dizziness and slow your breathing. It can even be fatal. Do not drink alcohol while taking pain medication.  4. Avoid strenuous or risky activities.  Ask for help when climbing stairs.   5. You may feel lightheaded.  If so, sit for a few minutes before standing.  Have someone help you get up.   6. If you have nausea (feel sick to your stomach), drink only clear liquids such as apple juice, ginger ale, broth or 7-Up.  Rest may also help.  Be sure to drink enough fluids.  Move to a regular diet as you feel able. Take pain medications with a small amount of solid food, such as toast or crackers, to avoid nausea.   7. A slight fever is normal. Call the doctor if your fever is over 100 F (37.7 C) (taken under the tongue) or lasts longer than 24 hours.  8. You may have a dry mouth, muscle aches, trouble sleeping or a sore throat.  These symptoms should go away after 24 hours.  9. Do not make important or legal decisions.   Pain Management:      1. Take pain medication (if prescribed) for pain as directed by your physician.        2. WARNING: If the pain medication you have been prescribed contains Tylenol  (acetaminophen), DO NOT take additional doses of Tylenol (acetaminophen).     Call your doctor for any of the followin.  Signs of infection (fever, growing tenderness at the surgery site, severe pain, a large amount of drainage or bleeding, foul-smelling drainage, redness, swelling).    2.  It has been over 8 to 10 hours since surgery and you are still not able to urinate (pee).    3.  Headache for over 24 hours.    4.  Numbness, tingling or weakness the day after  "surgery (if you had spinal anesthesia).  To contact a doctor, call _____________________________________ or:      [phone] and ask for the Resident On Call for:          __________________________________________ (answered 24 hours a day)      Emergency Department:  [facility]: [phone]  [facility]: [phone]               Rev. 10/2014       Pending Results     Date and Time Order Name Status Description    10/1/2018 1251 FISH With Professional Interpretation In process     10/1/2018 1251 CHROMOSOME BONE MARROW With Professional Interpretation In process     10/1/2018 1251 Leukemia Lymphoma Evaluation (Flow Cytometry) In process     10/1/2018 1251 Bone marrow biopsy In process             Admission Information     Date & Time Provider Department Dept. Phone    10/1/2018 Jeovanny Stinson MD UR PREOP/PHASE [phone]      Your Vitals Were     Blood Pressure Pulse Temperature Respirations Height Weight    123/73 69 97.5  F (36.4  C) (Oral) 14 1.721 m (5' 7.76\") 55.3 kg (121 lb 14.6 oz)    Pulse Oximetry BMI (Body Mass Index)                100% 18.67 kg/m2          MyChart Information     [employer] lets you send messages to your doctor, view your test results, renew your prescriptions, schedule appointments and more. To sign up, go to [email or web address] . Click on \"Log in\" on the left side of the screen, which will take you to the Welcome page. Then click on \"Sign up Now\" on the right side of the page.     You will be asked to enter the access code listed below, as well as some personal information. Please follow the directions to create your username and password.     Your access code is: [email or web address]  Expires: 2018  2:41 PM     Your access code will  in 90 days. If you need help or a new code, please call your [facility] clinic or [phone].        Care EveryWhere ID     This is your Care EveryWhere ID. This could be used by other " organizations to access your Christine medical records  [id number]        Equal Access to Services     MAGNOLIA ZAVALA : Trixie Woodward, oscar lacey, chilango mitchell, jaja johnston. So [facility] [phone].    ATENCIÓN: Si habla español, tiene a griffin disposición servicios gratuitos de asistencia lingüística. Llame al [phone].    We comply with applicable federal civil rights laws and [place] laws. We do not discriminate on the basis of race, color, national origin, age, disability, sex, sexual orientation, or gender identity.               Review of your medicines      Notice     You have not been prescribed any medications.             Protect others around you: Learn how to safely use, store and throw away your medicines at www.disposemymeds.org.             Medication List: This is a list of all your medications and when to take them. Check marks below indicate your daily home schedule. Keep this list as a reference.      Notice     You have not been prescribed any medications.

## 2018-10-01 NOTE — MR AVS SNAPSHOT
After Visit Summary   10/1/2018    Rommel Murphy    MRN: [id number]           Patient Information     Date Of Birth          2000        Visit Information        Provider Department      10/1/2018 1:00 PM Schroetter, Shannon J, APRN CNP Peds Blood and Marrow Transplant        Today's Diagnoses     Fanconi's anemia (H)    -  1          [facility] [place]note 2[facility] 9th floor  [address] [place] [address]  Phone: [phone]  Clinic Hours:   Monday-Friday:   7 am to 5:00 pm   closed weekends and major  holidays     If your fever is 100.5  or greater,   call the clinic during business hours.   After hours call [phone] and ask for the pediatric BMT physician to be paged for you.               Follow-ups after your visit        Who to contact     Please call your clinic at [phone] to:    Ask questions about your health    Make or cancel appointments    Discuss your medicines    Learn about your test results    Speak to your doctor            Additional Information About Your Visit        MyChart Information     [employer] is an electronic gateway that provides easy, online access to your medical records. With [employer], you can request a clinic appointment, read your test results, renew a prescription or communicate with your care team.     To sign up for [employer] visit the website at [email or web address]   You will be asked to enter the access code listed below, as well as some personal information. Please follow the directions to create your username and password.     Your access code is: [email or web address]  Expires: 2018  2:41 PM     Your access code will  in 90 days. If you need help or a new code, please contact your [facility] or call [phone] for assistance.        Care EveryWhere ID     This is your Care EveryWhere ID. This could be used by other organizations to access your [facility]  records  [id number]         Blood Pressure from Last 3 Encounters:   10/01/18 123/83   10/01/18 128/76    Weight from Last 3 Encounters:   10/01/18 55.3 kg (121 lb 14.6 oz)   10/01/18 55 kg (121 lb 4.1 oz)              Today, you had the following     No orders found for display         Today's Medication Changes      Notice     This visit is during an admission. Changes to the med list made in this visit will be reflected in the After Visit Summary of the admission.             Primary Care Provider Office Phone # Fax #    Christiano Hammond [phone]       PRIMARY CARE [facility] [address] [place] [address]        Equal Access to Services     MAGNOLIA ZAVALA : Trixie Woodward, wamargarette lacey, chilango kaalmada paula, jaja mcdonnell . So [facility] [phone].    ATENCIÓN: Si habla español, tiene a griffin disposición servicios gratuitos de asistencia lingüística. Llame al [phone].    We comply with applicable federal civil rights laws and [place] laws. We do not discriminate on the basis of race, color, national origin, age, disability, sex, sexual orientation, or gender identity.            Thank you!     [facility] your [facility]. Our goal is always to provide you with excellent care. Hearing back from our patients is one way we can continue to improve our services. Please take a few minutes to complete the written survey that you may receive in the mail after your visit with us. Thank you!             Your Updated Medication List - Protect others around you: Learn how to safely use, store and throw away your medicines at www.disposemymeds.org.      Notice     This visit is during an admission. Changes to the med list made in this visit will be reflected in the After Visit Summary of the admission.

## 2018-10-01 NOTE — PROGRESS NOTES
Met with Rommel, mother, and sister for introductions, described my role as peds BMT nurse coordinator in Rommel's medical care and provided business cards with information for future communication with Dr. Jeovanny Stinson and myself.  Rommel verified understanding of information presented.  I answered all questions to the best of my ability.  They will contact me or Dr. Jeovanny Stinson if they have additional questions related to transplant or Fanconi anemia care.    Targeted timeline to work-up/plan: Plan to check counts every 3 months, bone marrow biopsy today. Additional FA work-up to be done at home. If bone marrow biopsy does not show concerns, recheck annually.   Anticipated Transplant Protocol: N/A  Graft: N/A  Search consent signed: No  Labs drawn today: Unable to draw labs today due to insurance  Other siblings or family members being typed: N/A     Wt Readings from Last 2 Encounters:   10/01/18 55.3 kg (121 lb 14.6 oz)   10/01/18 55 kg (121 lb 4.1 oz)

## 2018-10-02 DIAGNOSIS — D61.03 FANCONI'S ANEMIA: Primary | ICD-10-CM

## 2018-10-02 LAB
COPATH REPORT: NORMAL
ERYTHROCYTE [DISTWIDTH] IN BLOOD BY AUTOMATED COUNT: 13.3 % (ref 10–15)
HCT VFR BLD AUTO: 42.7 % (ref 40–53)
HGB BLD-MCNC: 14 G/DL (ref 13.3–17.7)
MCH RBC QN AUTO: 32.8 PG (ref 26.5–33)
MCHC RBC AUTO-ENTMCNC: 32.8 G/DL (ref 31.5–36.5)
MCV RBC AUTO: 100 FL (ref 78–100)
PLATELET # BLD AUTO: 188 10E9/L (ref 150–450)
RBC # BLD AUTO: 4.27 10E12/L (ref 4.4–5.9)
WBC # BLD AUTO: 3.6 10E9/L (ref 4–11)

## 2018-10-02 NOTE — PROGRESS NOTES
I met with Rommel along with his mother, Davida, and his sister, Seema, in the [facility] on October 2, 2018 per the request of Jeovanny Stinson MD, to review Rommel's test results. Rommel expressed understanding and comfort with this information from our previous phone conversations. Copies of his test results and his results letter were provided to Rommel and, as his family members were present, copies of their final test results were also provided. 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 Rommel expressed comfort with this information and plans to speak further with Dr. Stinson regarding options for fertility analysis and preservation. He is aware that a chromosome breakage analysis is recommended following his genetic testing findings. Our team is looking into either insurance coverage for Rommel completing these studies at our institution or helping to facilitate chromosome breakage studies at home. 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 Since saliva samples typically contain blood cell contamination, we reviewed how this is the most likely explanation for this finding in his genetic studies. We also reviewed how the testing platform used is not validated for evaluating mosaicism and reported that the deletion was at 35-55% of the FANCA alleles analyzed which includes the expected range of 50%. Depending on the testing laboratory that completes Rommel's chromosome breakage testing, more information may become available on the potential for mosaicism from this analysis.  Rommel plans to discuss the implications of this mosaicism with Dr. Stinson.    Plan:  1. The results of Rommel's genetic testing for FA were reviewed and Rommel expressed comfort with this information.  2. Copies of Rommel's test results and results letter were provided.  3. Rommel signed a release today providing formal permission for communication of his medical information with his parents.  4. Rommel has my contact information if additional questions arise. Additional questions or concerns were denied.    Sincerely,    Maria Dolores Huynh MS, [facility]  Certified Genetic Counselor  Pediatric Blood & Marrow Transplant  [phone]  [email or web address]    Approximate time spent in consultation: 12 minutes

## 2018-10-02 NOTE — ANESTHESIA POSTPROCEDURE EVALUATION
Patient: Rommel Murphy    Procedure(s):  Bone Marrow Biopsy     Diagnosis:Fanconi Anemia   Diagnosis Additional Information: No value filed.    Anesthesia Type:  MAC    Note:  Anesthesia Post Evaluation    Patient location during evaluation: PACU and Bedside  Patient participation: Able to fully participate in evaluation  Level of consciousness: awake and alert  Pain management: adequate  Airway patency: patent  Cardiovascular status: stable  Respiratory status: room air and spontaneous ventilation  Hydration status: acceptable  PONV: none     Anesthetic complications: None          Last vitals:  Vitals:    10/01/18 1430 10/01/18 1445 10/01/18 1500   BP: 119/89 123/73 123/83   Pulse:      Resp: 14  16   Temp: 36.4  C (97.5  F)  36.3  C (97.3  F)   SpO2: 100% 100% 98%         Electronically Signed By: Emi Cisneros MD  October 1, 2018  7:23 PM

## 2018-10-03 LAB — COPATH REPORT: NORMAL

## 2018-10-05 PROBLEM — D61.03 FANCONI'S ANEMIA: Status: ACTIVE | Noted: 2018-10-05

## 2018-10-05 LAB — COPATH REPORT: NORMAL

## 2018-10-05 NOTE — PROGRESS NOTES
2018     Janel Pavon MD  [address]  [address]  [place] [address] [phone] (Work)  [phone] (FAX)  [email or web address]    Christiano Hammond M.D  [address]  [place] [address]  [phone] (Work)  [phone] (Fax)     RE:                   Sriram Murphy  MRN#:             [id number]  :                2000  JOSE G:                10/01/2018     Dear Yas and Manish:     Rommel was seen by me on 10/01/2018 at the [facility] for initial consultation regarding a recent diagnosis of Fanconi anemia.      CHIEF COMPLAINT:  Fanconi anemia Rommel is 18 year old recently diagnosed with Fanconi anemia based on genetic testing.  Rommel has been healthy; however, he was tested for Fanconi anemia because of the diagnosis of the same disease in his 17 year old sister, Seema.  Fanconi anemia is an autosomal recessive disease caused by one of 22 gene mutations.  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      He has had no problems with infections, except for occasional stye s that may require lancing. He remains active.  Good appetite; no specific complaints.  He denies brusing, petechiae, rashes, nausea, vomiting, or diarrhea or changes in bowel habits.     PAST MEDICAL HISTORY:   Rommel was born full term by  and was 5 lb 9 oz.  Pregnancy was unremarkable except except for nuchal cord.  About age of [age] he was diagnosed with lazy eye.  To date, there have been no PRBC or platelet transfusions.  Several months ago, he severed his thumb which was repaired in the ED without complication (no excess bleeding or infection by report).    Surgeries  None    Immunizations:    Up to date by report    Medications:  None    Allergies  None known     FAMILY MEDICAL HISTORY:   Mother and father are healthy overall.  Father has high cholesterol and  hypertension.  Rommel s sister is 17 years old who recently underwent bone marrow transplant from an HLA matched unrelated donor (June 5, 2018) for the treatment of Fanconi anemia-related bone marrow failure.      There is no strong family history of cancers involving breast, lung, brain, and leukemia.  There is no family history of Fanconi anemia, or relatives with birth defects or bone marrow failure.   There is no consanguinity.     SOCIAL HISTORY:   Works two jobs ([employer] and [employer]; ).   No recent travel history.  Lives at home with parents.  Smokes marijuana frequently; uses e-cigarette in place of cigarette smoking because he believes it is safer.  Flavors, number of cartridges per day are unknown.  Alcohol use socially (mostly beer).  Sexually active, using protection most of the time. Pets (one cat and one dog).     REVIEW OF SYSTEMS:   A 10 point review of systems was obtained.  Pertinent positives and negatives are included in the HPI.       PHYSICAL EXAMINATION:   Vital Signs: /76, HR 81, RR 18 , Temp 98.3. Weight 55 (?4%ile); height 172.1 cm (5 7.8 inches,? 20%ile). Rommel is alert in no acute distress.  HEENT exam is unremarkable; buccal mucosa shows whitish stria on the buccal mucosa with some degree of patchy erythema mostly along the dental line; teeth are in good repair; conjuctivae are pink, sclerae white. The lung exam reveals good air exchange bilaterally; no adventitial sounds. Heart reveals a normal S1 and S2, no S3 or S4; no clicks; no murmurs; pulses are normal throughout with good perfusion distally.  The abdomen is soft and non-tender; there is no evidence of hepatosplenomegaly or masses.  There are good bowel sounds throughout. : phallus normal; circumcised; testicles normal in size and consistency; non tender.  Perianal region: normal without evidence of lichenoid changes or masses.  Skin: no rashes; no ecchymoses or petechiae; scattered café aul lait spots with  largest one on mid left abdomen.  Musculoskeletal: thenar eminences, thumbs, radii are normal.     LABORATORY EVALUATION:  CBC (09/18/2018) WBC 3.6, Hgb 13.2, MCV 95.1, ,000, ANC 1.2, ALC 1.6.  Differential normal.  CBC (10/02/2018) WBC 3.6, Hgb 14.0,  (normal ), Platelet 188,000.  BMA/BX: Hypocellular marrow (25%) with trilineage hematopoiesis with no significant dysplasia or increase in blasts; peripheral blood shows slight leukopenia.  Flow: No aberrant immunophenotype of the myeloid blasts; 0.3% CD34 positive blasts.  FISH/cytogenetics are pending.     ASSESSMENT  1) Fanconi anemia with early evidence of bone marrow abnormalities. There are no significant congenital anomalies. Weight is 4th%ile and height 20th%ile. BM reveals decreased cellularity and peripheral blood reveals leukopenia and borderline elevated MCV.  Patient consented to a research marrow sample to be sent to my lab for evaluation of colony forming cell MMC sensitivity and storage if there are left over cells.    2) Immunization record reviewed: up to date.       GENERAL COUNSELING AND RECOMMENDATIONS  1) Biology of FA: described the interactions between the different gene products and differences between various genotypes in rate of disease progression and complications.  Discussed impact of the underlying DNA repair defect in terms of hematopoietic stem cell loss and cancer predisposition and rationale behind avoidance of sun UV light, xrays, and increased skin, oral and blood/marrow surveillance.  Also discussed avoidance of smoking and alcohol use.  a. While diagnosed by mutation analysis, diepoxybutane (JESSE) testing should still be performed as this is the diagnostic test for FA.  Due to insurance limitations, this was not performed here.  Furthermore, we have not performed a comprehensive evaluation looking for other internal malformations that could impact surveillance and treatments. For example, all FA patients should  have an abdominal US looking for renal/ureteral/urinary bladder and liver/gall bladder defects, ECHOcardiogram looking for cardiac malformations and abnormal function, electrolytes and liver function tests looking for abnormal renal and liver function, MRI brain looking for corpus callosum and pituitary anomalies as well as evidence of vascular malformations.  A comprehensive screen for common hormone deficiencies (eg thyroid, growth hormone and testosterone) which could impact metabolism, development and bone health.    2) Hematological Complications: The median time to diagnosis of FA is 7 years of age and time to marrow failure is 11 years.  91% of patient will have bone marrow failure, MDS or leukemia; therefore transplant is likely at some point in the future. Rommel s counts are good for age.  Although this is likely due to typical heterogeneity of the disease progression, in some circumstances, it is a manifestation of somatic mosaicism which will be evaluated.  We discussed the potential implications of somatic mosaicism.  In our own experience, we have had 5 patients all in the FANCA group with reversion resulting in stable counts for a prolonged period of time.  One patient developed a clonal chromosomal anomaly in the residual FA cells suggesting that non-revertant cells retain a MDS/leukemia predisposition.    a. I discussed the need for annual marrows, including karyotyping and FISH for common chromosomal aberrations, and every 3 month CBCs.  We instructed Rommel on how to keep an excel spreadsheet for WBC, Hgb, MCV, Platelets and ANC.  I also reviewed the characteristic cytogenetic changes that might precede leukemia or MDS, most notably in chromosomes 1, 3, 4, 7,8, and 11.  While not discussed in view of his current hematological status, generally it is important to avoid transfusions if possible because it is associated with reduced survival after BMT.  However, obviously if absolutely needed, we  would recommend CMV negative, leukodepleted, irradiated blood products unless we know his is CMV positive (all products should be irradiated). When possible, avoid the use of drugs with marrow suppressive side effects; he should be reminded to review every medication with his pharmacist or provider should any drug be prescribed for infection or other reason.  For example, Bactrim used long term should be avoided if possible.  b. Role of Androgens.  I discussed what is known and unknown about use of androgens, acknowledging that there has been renewed interest in androgens over the past few years. While there is clearly a place for androgens in FA patients with high risk co-morbidities or poor donor options in terms of HLA match, Rommel has no obvious co morbidities that would increase the risk of  transplant-related complications.  HLA typing was previously performed when he was being considered as a donor for his sister Seema.  Donors are likely available for transplantation in the future. In addition, androgens have risks and side effects and multiple past studies suggest that androgen use is associated with poorer transplant outcome.  Still, it is recognized that there are cogent counter-arguments regarding the use of androgens.  We will keep Rommel and his family apprised of what is learned over time.  c. Role of HSC gene therapy.  Recent study in [place] suggests that HSC gene therapy may be possible in the not too distant future.  The study is currently restricted to patients <12 years with good blood counts and absence of MDS, leukemia or cytogenetic abnormality.  6 patients have been treated thus far with lentivirus gene corrected cells without conditioning.  In 4 of 4 out beyond 6 months, there is evidence of corrected marrow cells in all lineages ranging from 3%-45% range.  We will keep Rommel up to date on the results as this might be an attractive option moving forward.  However, he is currently  ineligible based on age.    3) Procedure and Outcomes of Hematopoietic Stem Cell Transplant.  While this was not discussed at this visit specifically, Rommel is aware of Seema s treatment course.  As we get closer to transplant, this will be gone over in detail.  Because he does not have an HLA matched sibling donor, he would receive  cGy, fludarabine 140 mg/m2 (35 mg/m2 daily IV x 4 days, day -5 to -2) and cyclophosphamide 40 mg/kg (10 mg/kg IV daily x 4 days, day -5 to -2), followed by T cell depleted bone marrow transplant.  This approach was pioneered at the [facility] with excellent outcomes to date.  Incidence of acute and chronic GVHD is low and survival is >80% in those with HLA matched donors at this age range.  Prevention of GVHD is particularly important since GVHD is a risk for cancers.  3) Endocrinopathies: Higher incidences of type 2 diabetes, thyroid insufficiency, growth hormone deficiency are common.  All patients with FA should have a comprehensive endocrine assessment.      4) Fertility: We discussed the high risk of infertility in males with FA.  Considering the availability of assisted reproductive technologies available, a semen analysis should be performed which he is interested in pursuing.  However it is likely that he will have no normal spermatozoa.  That said, I encouraged him to use condoms as we haven t proven that he is sterile (in addition to the protection for infections).       5) Cancer Risk - FA patients have a higher cancer risk compared to the general population, particularly of the head and neck, esophagus, perianal region and skin.  However other cancers also occur at higher frequency.  We recommend surveillance for all FA patients.  Specifically, we recommend every 6 month dental evaluations with good evaluation of the oral cavity, annual ENT evaluations with direct laryngoscopy by an endoscope (not dental mirror) to look at the upper larynx and esophagus  routinely, annual perianal/rectal exam, and annual dermatology whole body evaluations.  Particularly because he lives in [place], I recommended liberal use of lotions to provide UV protection from sun exposure in addition to hats and long sleeve shirts.  As this is a DNA repair defect, xrays should be used judiciously.  He was also instructed on the importance of avoiding alcohol and cigarette smoking.  There is emerging evidence on aberrant aldehyde metabolism in FA patients.  We mail research updates 2-3 times per year on this topic as well as new instructions on cancer prevention and screening changes.  Also we encourage families to connect with the FA Research Fund either at [place] or other outlets for current information.         Unfortunately, we were not able to do as thorough a work up as hoped due to insurance restrictions.  We will continue to work with his insurer.  However, there are a number of studies he should have soon, specifically consults with dermatology and ENT. You could screen CBCs and chemistries as well as basic endocrine studies such as blood sugar, hemoglobin A1C, T4 and TSH, testosterone.  If possible, schedule a semen analysis and request a JESSE test, abdominal US and echo.  Again, if permitted, I will see him in December when his sister returns for her 6 month check up.  Please call with any questions.      Sincerely yours,        Jeovanny Stinson M.D.   Professor of Pediatrics   Phone:  [phone]  Total face to face time: 2 hours (>50% counseling)

## 2018-10-08 LAB — COPATH REPORT: NORMAL

## 2018-10-19 NOTE — PROVIDER NOTIFICATION
10/01/18 0900   Child Life   Location BMT Clinic  (New Consult for Fanconi Anemia)   Intervention Initial Assessment;Family Support   Family Support Comment CFLS and COTY [place] spent time with patient, his mother and sister focusing on coping with patient's new diagnosis and upcoming bone marrow biopsy. CFLS provided preparation for first surgery center experience and bone marrow biopsy.   Sibling Support Comment Patient's younger sister also has Fanconi Anemia and went through BMT here   Outcomes/Follow Up Continue to Follow/Support

## 2018-10-23 LAB — COPATH REPORT: NORMAL

## 2018-10-31 ENCOUNTER — TRANSFERRED RECORDS (OUTPATIENT)
Dept: HEALTH INFORMATION MANAGEMENT | Facility: CLINIC | Age: 18
End: 2018-10-31

## 2018-11-20 ENCOUNTER — APPOINTMENT (RX ONLY)
Dept: URBAN - METROPOLITAN AREA CLINIC 149 | Facility: CLINIC | Age: 18
Setting detail: DERMATOLOGY
End: 2018-11-20

## 2018-11-20 DIAGNOSIS — L71.8 OTHER ROSACEA: ICD-10-CM

## 2018-11-20 DIAGNOSIS — L81.4 OTHER MELANIN HYPERPIGMENTATION: ICD-10-CM

## 2018-11-20 PROBLEM — L20.84 INTRINSIC (ALLERGIC) ECZEMA: Status: ACTIVE | Noted: 2018-11-20

## 2018-11-20 PROCEDURE — 99203 OFFICE O/P NEW LOW 30 MIN: CPT

## 2018-11-20 PROCEDURE — ? SUNSCREEN RECOMMENDATIONS

## 2018-11-20 PROCEDURE — ? PRESCRIPTION

## 2018-11-20 PROCEDURE — ? COUNSELING

## 2018-11-20 RX ORDER — AZELAIC ACID 0.15 G/G
GEL TOPICAL QD
Qty: 1 | Refills: 2 | Status: ERX | COMMUNITY
Start: 2018-11-20

## 2018-11-20 RX ADMIN — AZELAIC ACID: 0.15 GEL TOPICAL at 15:35

## 2018-11-20 ASSESSMENT — LOCATION DETAILED DESCRIPTION DERM
LOCATION DETAILED: LEFT MEDIAL UPPER BACK
LOCATION DETAILED: INFERIOR MID FOREHEAD

## 2018-11-20 ASSESSMENT — LOCATION ZONE DERM
LOCATION ZONE: FACE
LOCATION ZONE: TRUNK

## 2018-11-20 ASSESSMENT — LOCATION SIMPLE DESCRIPTION DERM
LOCATION SIMPLE: INFERIOR FOREHEAD
LOCATION SIMPLE: LEFT UPPER BACK

## 2018-11-23 ENCOUNTER — RX ONLY (OUTPATIENT)
Age: 18
Setting detail: RX ONLY
End: 2018-11-23

## 2018-11-23 RX ORDER — METRONIDAZOLE 10 MG/G
GEL TOPICAL
Qty: 1 | Refills: 1 | Status: ERX | COMMUNITY
Start: 2018-11-23

## 2019-07-31 ENCOUNTER — APPOINTMENT (RX ONLY)
Dept: URBAN - METROPOLITAN AREA CLINIC 149 | Facility: CLINIC | Age: 19
Setting detail: DERMATOLOGY
End: 2019-07-31

## 2019-07-31 DIAGNOSIS — D22 MELANOCYTIC NEVI: ICD-10-CM

## 2019-07-31 DIAGNOSIS — L71.8 OTHER ROSACEA: ICD-10-CM | Status: INADEQUATELY CONTROLLED

## 2019-07-31 DIAGNOSIS — L81.4 OTHER MELANIN HYPERPIGMENTATION: ICD-10-CM

## 2019-07-31 DIAGNOSIS — L81.5 LEUKODERMA, NOT ELSEWHERE CLASSIFIED: ICD-10-CM

## 2019-07-31 PROBLEM — D22.5 MELANOCYTIC NEVI OF TRUNK: Status: ACTIVE | Noted: 2019-07-31

## 2019-07-31 PROBLEM — D56.9 THALASSEMIA, UNSPECIFIED: Status: ACTIVE | Noted: 2019-07-31

## 2019-07-31 PROCEDURE — ? PRESCRIPTION MEDICATION MANAGEMENT

## 2019-07-31 PROCEDURE — ? COUNSELING

## 2019-07-31 PROCEDURE — 99214 OFFICE O/P EST MOD 30 MIN: CPT

## 2019-07-31 PROCEDURE — ? SUNSCREEN RECOMMENDATIONS

## 2019-07-31 PROCEDURE — ? INVENTORY

## 2019-07-31 ASSESSMENT — LOCATION ZONE DERM
LOCATION ZONE: FACE
LOCATION ZONE: ARM
LOCATION ZONE: TRUNK

## 2019-07-31 ASSESSMENT — LOCATION SIMPLE DESCRIPTION DERM
LOCATION SIMPLE: INFERIOR FOREHEAD
LOCATION SIMPLE: CHEST
LOCATION SIMPLE: LEFT SHOULDER
LOCATION SIMPLE: LEFT FOREARM
LOCATION SIMPLE: RIGHT FOREARM

## 2019-07-31 ASSESSMENT — LOCATION DETAILED DESCRIPTION DERM
LOCATION DETAILED: LEFT ANTERIOR SHOULDER
LOCATION DETAILED: LEFT MEDIAL SUPERIOR CHEST
LOCATION DETAILED: LEFT PROXIMAL DORSAL FOREARM
LOCATION DETAILED: RIGHT PROXIMAL DORSAL FOREARM
LOCATION DETAILED: INFERIOR MID FOREHEAD

## 2019-07-31 NOTE — PROCEDURE: COUNSELING
Patient Specific Counseling (Will Not Stick From Patient To Patient): Pt states he is not consistent with treatment. I have re educated pt on prior treatment plan.
Detail Level: Zone
Detail Level: Generalized
Detail Level: Detailed

## 2019-07-31 NOTE — PROCEDURE: PRESCRIPTION MEDICATION MANAGEMENT
Detail Level: Zone
Continue Regimen: Finacea 15 % topical gel TP Frequency: QD Sig: Apply QD-BID to affected areas. (2 refills)
Render In Strict Bullet Format?: No

## 2019-12-10 ENCOUNTER — TELEPHONE (OUTPATIENT)
Dept: TRANSPLANT | Facility: CLINIC | Age: 19
End: 2019-12-10

## 2019-12-10 NOTE — TELEPHONE ENCOUNTER
Left voicemail for mom to call back with update on insurance status for Rommel. Per previous discussions, she was anticipating his insurance changing anytime and would provide a timeline as soon as possible. Instructed her that Rommel is due for a bone marrow biopsy and other evaluations. Priority should be on having a bone marrow biopsy completed as soon as possible, whether at home in [place] or here at our center.

## 2020-01-31 ENCOUNTER — CARE COORDINATION (OUTPATIENT)
Dept: TRANSPLANT | Facility: CLINIC | Age: 20
End: 2020-01-31

## 2020-01-31 DIAGNOSIS — D61.03 FANCONI'S ANEMIA: Primary | ICD-10-CM

## 2020-02-11 ENCOUNTER — TELEPHONE (OUTPATIENT)
Dept: TRANSPLANT | Facility: CLINIC | Age: 20
End: 2020-02-11

## 2020-02-11 NOTE — LETTER
DATE: 2/25/2020  TO: Rommel Murphy  FROM:  [facility]     Good Morning,    My name is Shauna, your complex  for the [facility]. I hope this note finds you well. It is time to look at scheduling June follow up appointments. I have been asked by Dr. Stinson and Florencio, nurse coordinator to schedule the following  appointments:    CONSULTATIONS  Dr. Stinson  Oral Pathology  ENT  Dermatology  Endocrine   Genetic counseling    TESTS/PROCEDURES  Fasting Labs  Bone marrow biopsy  Brain MRI  Dexa scan  Bone age hand x-ray    Looking at providers scheduling it looks like any week in June is available for the listed providers above.  Please let me know what will work best for you or if you have any dates that you would prefer for me to look at. I have included Florencio in on this email in case you have a questions.     Your final calendar will be sent by secured email and once you receive it, it is only accessible for 90 days.     One last detail to go over. Have you had any recent changes to address, phone number or insurance that we need to update in the chart? If there is just let me know and I will get that updated for you.    If you have any questions regarding this appointment, please call me direct at:  [phone] or toll free at [phone].    Sincerely,  Shauna Arriaga  BMT Procedure

## 2020-02-11 NOTE — LETTER
"UPDATE: 5/21/2020  UPDATE: 4/20/2020  DATE: 4/9/2020  TO: Rommel Murphy  FROM:  [facility]     Your follow up appointments are scheduled for:    Since your child will be having a sedated procedure during this visit, a Pre-Op History and Physical (H&P) is required to be completed by your pierre local practioner 2-3 weeks before the noted appointments.  The H&P should include information that your child is well and able to receive sedation for the noted procedures to be done on the specific \"Month/Day/Year\".  Please ask your provider to FAX the completed H&P one week prior to the scheduled procedures. Failure to complete the required H&P will result in a cancellation.  Please call our schedulers if this poses a problem and we will attempt to make alternative plans.     August 3, 2020    8:30 am  Endocrinology Consult with Dr. Escobar - Video Visit (See Attached Instructions)      10:00 am  COVID Testing with BMT Nurse - [facility] / 9th [facility]     1:00 pm  Hearing Test & Exam - [facility], 2nd Floor / [address]    August 4, 2020    ** Nothing to eat/drink, except water, after midnight in preparation for FASTING LABS**   8:30 am  Fasting Lab Draw - [facility] / 9th Floor      9:00 am  Dexa Scan - [facility], [address]   9:45 am  Brain MRI - [facility]   10:30 am  Bone Age Scan - [facility]     ** Pre-admission will call to confirm check in time and review instructions.  They can be reached at [phone]**   **See Attached Sedation Instructions**  11:00 am Check-In - [facility], [facility] / 2nd Floor   12:00 pm  Bone Marrow Biopsy with Jack Guerrero - [facility]    August 5, 2020   9:00 am  Genetics Consult with Maria Dolores Huynh - [facility] / 9th Floor    2:00 pm             Oral Pathology Exam -  [facility], 6th Floor / Rm: " 296     August 6, 2020   12:30 pm  Exam with Dr. Stinson - [facility]    2:00 pm Dermatology Consult with Dr. Cheng - [facility],  [address] [facility] / 3rd Floor     - If you are currently on Gengraf (Cyclosporine), please hold your dose, on day of blood draw, until a blood level is drawn.  - If you are taking a blood thinner (for instance: aspirin, coumadin, lovenox, etc.) please contact your nurse coordinator or physician for instructions one week prior to your appointment.  - Our financial staff will attempt to obtain any necessary authorization for services.  However we recommend you contact your insurance company for confirmation of coverage.  - For financial inquiries:  o If you received your transplant within one year of these services, please contact [phone] and ask for the Transplant Finance.  o If you received your transplant greater than 1 year prior to these services, contact your insurance company directly by calling the telephone number on the back of your card.    If you have any questions regarding this appointment, please call me direct at:  [phone] or toll free at [phone].    Sincerely,  Shauna Arriaga  BMT Procedure

## 2020-02-11 NOTE — TELEPHONE ENCOUNTER
Rommel Murphy  2000  MRN: [id number]    Consults:  Dr. Stinson  Oral Pathology  ENT  Dermatology  Endocrine   Genetic counseling    Testing/Imaging:  Fasting labs (7-9am)  Bone marrow biopsy  Brain MRI  Dexa scan  Bone age hand x-ray    Thanks!  Florencio

## 2020-04-15 NOTE — TELEPHONE ENCOUNTER
Sorry about that - I have our schedulers cc'ed here and they can have it corrected.     Florencio    Previous Messages     ----- Message -----   From: Ruth Barker RN   Sent: 4/15/2020   2:17 PM CDT   To: Destiny Serna RN, Kwaku Flowers Rn-Ump   Subject: Adult Endocrinology - Not Peds                   Hello -     This patient was scheduled as a NEW patient with Dr. Shin - Dr. Shin reviewed the chart, this patient is above 18 years old and since he is NEW he needs to be re-scheduled with adult endocrinology.     See note below.     Thanks!    ----- Message -----   From: Alec Shin MD   Sent: 4/15/2020   1:13 PM CDT   To: Ruth Barker RN, Peds Endo Rn-Ump   Subject: RE: NEW! FA - Sibling                             Review of Dr. Stinson's note from 2018 shows that he is sexually mature and likely done growing. I would recommend he see Adult Endo.   Alfredo Reyes   ----- Message -----   From: Ruth Barker RN   Sent: 4/15/2020  10:59 AM CDT   To: Alec Shin MD, Kwaku Flowers Rn-p   Subject: NEW! FA - Sibling                                 Hi Alfredo -     This patient is new never seen by any endocrinologist in our team inpatient or otherwise, BMT has scheduled him to coordinate with his sisters visit who had care with you prior to turning 18 years of age. (Sister: Seema Murphy C. // MRN: [id number]).     I'm wondering because he would be a new patient and he's 19 years of age - I thought that if BMT patients were above 18 years old we were telling them to schedule with adult endocrinology, however I didn't know if you were making exceptions with siblings. Norma Stephenson looks like she also is above 18 and does have care locally with an endocrinologist I can see through care everywhere.     Just wondering your thoughts, or if I should reach out to BMT and have them re-schedule with adults.     Appointment isn't until July     Thanks!

## 2020-04-17 NOTE — TELEPHONE ENCOUNTER
RECORDS RECEIVED FROM: Internal   DATE RECEIVED: 7/6/20   NOTES (FOR ALL VISITS) STATUS DETAILS   OFFICE NOTES from referring provider Internal Dr Jeovanny Roman @ [facility] BMT:  2/11/20 encounter  10/1/18   OFFICE NOTES from other specialist Care Everywhere Dr Janel Pavon @ [facility] Hem/On[facility], [facility] [place]:  1/9/20 1/29/19    Dr Christiano Hammond @ [facility], [place]:  9/21/18   ED NOTES N/A    OPERATIVE REPORT  (thyroid, pituitary, adrenal, parathyroid) N/A    MEDICATION LIST Internal    IMAGING      DEXASCAN N/A    MRI (BRAIN) In process [facility] [place] [facility]:  MRI Head 10/31/18   XR (Chest) N/A    CT (HEAD/NECK/CHEST/ABDOMEN) N/A    NUCLEAR  N/A    ULTRASOUND (HEAD/NECK) N/A    LABS     DIABETES: HBGA1C, CREATININE, FASTING LIPIDS, MICROALBUMIN URINE, POTASSIUM, TSH, T4    THYROID: TSH, T4, CBC, THYRODLONULIN, TOTAL T3, FREE T4, CALCITONIN, CEA Care Everywhere   1/9/20        Action 4/17/20 MV 11.51am   Action Taken Imaging request faxed to [facility] [place] [facility] for:  MRI Head 10/31/18    Fax #: [phone]     Action 04/28/20 2:02 PM - Breanne   Action Taken  Imaging disc received from [facility] and sent to [facility] to be uploaded into PACs.

## 2020-05-19 NOTE — TELEPHONE ENCOUNTER
Phyllis Candelaria, RN  P Bmt Peds Scheduling Pool-Ump               Please cancel July appointments and reschedule for first week of August. To be coordinated with sister, Seema's appointments.     Phyllis

## 2020-05-21 ENCOUNTER — CARE COORDINATION (OUTPATIENT)
Dept: TRANSPLANT | Facility: CLINIC | Age: 20
End: 2020-05-21

## 2020-05-21 DIAGNOSIS — Z11.59 ENCOUNTER FOR SCREENING FOR OTHER VIRAL DISEASES: Primary | ICD-10-CM

## 2020-07-06 ENCOUNTER — PRE VISIT (OUTPATIENT)
Dept: ENDOCRINOLOGY | Facility: CLINIC | Age: 20
End: 2020-07-06

## 2020-07-31 ENCOUNTER — TELEPHONE (OUTPATIENT)
Dept: ENDOCRINOLOGY | Facility: CLINIC | Age: 20
End: 2020-07-31

## 2020-07-31 NOTE — TELEPHONE ENCOUNTER
SUMAYAM with Laura to advise adal that our medical director has not approved in clinic appointments at this time and so the appt must remain a virtual visit.

## 2020-07-31 NOTE — TELEPHONE ENCOUNTER
[facility] Call Center    Phone Message    May a detailed message be left on voicemail: yes     Reason for Call: Other: Peds ENT, Nurse Coordinator Ruby, is wanting to know if this the Pt's 8/3/20 video appt could be changed to an in-clinic appt because the Pt is coming from [place] on Monday to be seen for multiple appts. Writer called clinic coordinators, but was unable to speak with anyone. Please follow up with Ruby by calling [phone] and selecting opt 2 for peds. She said Laura will answer the phone and they can leave the answer/information with her and she will get it to Ruby.     Action Taken: Message routed to:  [facility] ([facility] endo    Travel Screening: Not Applicable

## 2020-08-03 ENCOUNTER — VIRTUAL VISIT (OUTPATIENT)
Dept: ENDOCRINOLOGY | Facility: CLINIC | Age: 20
End: 2020-08-03
Payer: COMMERCIAL

## 2020-08-03 ENCOUNTER — OFFICE VISIT (OUTPATIENT)
Dept: OTOLARYNGOLOGY | Facility: CLINIC | Age: 20
End: 2020-08-03
Attending: OTOLARYNGOLOGY
Payer: COMMERCIAL

## 2020-08-03 ENCOUNTER — OFFICE VISIT (OUTPATIENT)
Dept: AUDIOLOGY | Facility: CLINIC | Age: 20
End: 2020-08-03
Attending: OTOLARYNGOLOGY
Payer: COMMERCIAL

## 2020-08-03 ENCOUNTER — ALLIED HEALTH/NURSE VISIT (OUTPATIENT)
Dept: TRANSPLANT | Facility: CLINIC | Age: 20
End: 2020-08-03
Attending: PEDIATRICS
Payer: COMMERCIAL

## 2020-08-03 VITALS — TEMPERATURE: 98.2 F | HEIGHT: 68 IN | WEIGHT: 129.7 LBS | BODY MASS INDEX: 19.66 KG/M2

## 2020-08-03 DIAGNOSIS — D61.03 FANCONI'S ANEMIA: Primary | ICD-10-CM

## 2020-08-03 DIAGNOSIS — Z13.1 SCREENING FOR DIABETES MELLITUS: ICD-10-CM

## 2020-08-03 DIAGNOSIS — Z13.29 SCREENING FOR THYROID DISORDER: ICD-10-CM

## 2020-08-03 DIAGNOSIS — Z11.59 ENCOUNTER FOR SCREENING FOR OTHER VIRAL DISEASES: ICD-10-CM

## 2020-08-03 DIAGNOSIS — E29.1 HYPOGONADISM MALE: Primary | ICD-10-CM

## 2020-08-03 DIAGNOSIS — D61.03 FANCONI'S ANEMIA: ICD-10-CM

## 2020-08-03 DIAGNOSIS — E78.5 DYSLIPIDEMIA: ICD-10-CM

## 2020-08-03 LAB
LABORATORY COMMENT REPORT: NORMAL
SARS-COV-2 RNA SPEC QL NAA+PROBE: NEGATIVE
SARS-COV-2 RNA SPEC QL NAA+PROBE: NORMAL
SPECIMEN SOURCE: NORMAL
SPECIMEN SOURCE: NORMAL

## 2020-08-03 PROCEDURE — 92557 COMPREHENSIVE HEARING TEST: CPT | Performed by: AUDIOLOGIST

## 2020-08-03 PROCEDURE — G0463 HOSPITAL OUTPT CLINIC VISIT: HCPCS | Mod: 25,ZF

## 2020-08-03 PROCEDURE — 25000125 ZZHC RX 250: Mod: ZF | Performed by: OTOLARYNGOLOGY

## 2020-08-03 PROCEDURE — 92550 TYMPANOMETRY & REFLEX THRESH: CPT | Mod: 52 | Performed by: AUDIOLOGIST

## 2020-08-03 PROCEDURE — U0003 INFECTIOUS AGENT DETECTION BY NUCLEIC ACID (DNA OR RNA); SEVERE ACUTE RESPIRATORY SYNDROME CORONAVIRUS 2 (SARS-COV-2) (CORONAVIRUS DISEASE [COVID-19]), AMPLIFIED PROBE TECHNIQUE, MAKING USE OF HIGH THROUGHPUT TECHNOLOGIES AS DESCRIBED BY CMS-2020-01-R: HCPCS | Performed by: PHYSICIAN ASSISTANT

## 2020-08-03 RX ADMIN — Medication 0.5 ML: at 14:30

## 2020-08-03 ASSESSMENT — PAIN SCALES - GENERAL: PAINLEVEL: NO PAIN (0)

## 2020-08-03 ASSESSMENT — MIFFLIN-ST. JEOR: SCORE: 1567.69

## 2020-08-03 NOTE — PROGRESS NOTES
"fanconi anemia  Grace Castro CMA    Rommel Murphy is a 20 year old male who is being evaluated via a billable video visit.      The patient has been notified of following:     \"This video visit will be conducted via a call between you and your physician/provider. We have found that certain health care needs can be provided without the need for an in-person physical exam.  This service lets us provide the care you need with a video conversation.  If a prescription is necessary we can send it directly to your pharmacy.  If lab work is needed we can place an order for that and you can then stop by our lab to have the test done at a later time.    Video visits are billed at different rates depending on your insurance coverage.  Please reach out to your insurance provider with any questions.    If during the course of the call the physician/provider feels a video visit is not appropriate, you will not be charged for this service.\"    Patient has given verbal consent for Video visit? Yes  How would you like to obtain your AVS? MyChart  If you are dropped from the video visit, the video invite should be resent to: Text to cell phone: [phone]  Will anyone else be joining your video visit? Yes: mother (lior). How would they like to receive their invitation? Text to cell phone: [phone]        Video-Visit Details    Type of service:  Video Visit    Video Start Time:8:35 am  End: 9:30 am    Originating Location (pt. Location): Home    Distant Location (provider location):  [facility] ENDOCRINOLOGY     Platform used for Video Visit: [facility]      Endocrinology Clinic New Consult      Rommel Murphy MRN:[id number] YOB: 2000  Primary care provider: Christiano Hammond     Reason for Endocrine consult: surveillance for endocrinopathies in Fanconi anemia    HPI:  Rommel Murphy is a 20 year old male with PMHx of Fanconi anemia, autosomal recessive, diagnosed in 2018 when his younger sister was found to have " "aplastic anemia and further workup showed that she had Harpal anemia. He also underwent genetic testing and was found to have the same condition.  The patient himself has not had any significant health concerns related to Fanconi anemia. His sister underwent hematopoietic stem cell transplant. He has not developed any cytopenias. He does not have any obvious malformations. Has not required transfusions of any blood products    History related to endocrinopathies:    thyroid disease: patient has never been diagnosed to have any thyroid disease before. Denies any changes in his weight, cold or heat intolerance, changes in bowel habits, dryness of skin, hair loss, anxiety, palpitations, tremors, muscle cramps, fatigue. He reports that his energy levels are good    Stature: the patient is now 5'8\" tall. He is about to 3 inches shorter than his father and his mother is 5'3\" tall. He will is around 58 to 60 kg will body mass index around 19 to 20.    Glucose metabolism: the patient has never been diagnosed to have either high blood glucose levels are low blood glucose levels. He does have family history of diabetes and his maternal uncle. Previous HbA1c levels have been within normal limits, last one was done in January this year    features of metabolic syndrome: the patient did have mildly elevated total cholesterol and LDL levels at the time of diagnosis of Fanconi anemia in 2018. Had not been started on any medication subsequently. Has not had his cholesterol levels checked since then. Denies any family history of early heart disease. Denies any yellowish colored skin lesions around the eyes or over elbows and knees. Normal body mass index. Denies any history of hypertension, or diabetes    puberty: the patient states that he started noticing growth of facial hair and hair over his axilla and groin around the age of 16 years. He states that he has decent amount of hair in his axillary region and in the groin area. " During the video visit he is seen to have decent amount of growth over his upper lip and some over his jawline as well    infertility risk: the patient has previously had normal testosterone level with slightly elevated FSH in 2018 which had been concerning for early signs of hypogonadism. The patient had been asked to undergo semen analysis, has not been done so far. He does not report any issues with performance whenever he is with his girlfriend. He has not tried to conceive with his girlfriend yet, users contraception most of the time.      Endocrine review of system:    -Denies any polyuria, nocturia, excessive thirst or know sodium issues  -Denies polydipsia, polyphagia, blurred vision.  Denies any known history of diabetes personally  -Denies diarrhea or constipation or foul smelling stools or floating fatty stools   -Denies history of diarrhea, peptic ulcer disease or bleeding   -Denies episodic headache or unilateral headaches, denies any flushing or palpitation symptoms associated with that headache  -Denies any known thyroid issues  -Denies history of nipple discharge or gynecomastia or taking antifungal drugs  -Denies any known adrenal issues or low energy on a daily basis or nausea, vomiting or new abdominal pain  -Denies skin changes, skin stretching or tanning  -Denies pigmentation in elbows, palate or mucosa  -Denies weight changes  -Denies muscle cramps or constipation      ROS:  All 12 systems were reviewed and negative except as mentioned in HPI    Past Medical/Surgical History:  Past Medical History:   Diagnosis Date     Fanconi's anemia (H)      Past Surgical History:   Procedure Laterality Date     BONE MARROW BIOPSY, BONE SPECIMEN, NEEDLE/TROCAR N/A 10/1/2018    Procedure: BIOPSY BONE MARROW;  Bone Marrow Biopsy ;  Surgeon: Schroetter, Shannon J, APRN CNP;  Location: [facility] OR       Allergies:  No Known Allergies    PTA Meds:  Prior to Admission medications    Not on File        Current  Medications:   No current outpatient medications on file.     No current facility-administered medications for this visit.        Family History:  Family History   Problem Relation Age of Onset     Fanconi anemia Sister        Social History:  Social History     Tobacco Use     Smoking status: Current Some Day Smoker     Smokeless tobacco: Never Used   Substance Use Topics     Alcohol use: Not on file         Physical examination:  General:  Healthy, alert and oriented X3, NAD, answering questions appropriately.  HEENT: no obvious malformations noted involving the face. Appears to have normal dentition. No obvious neck swelling. Normal distribution of hair over her upper lip and jaw  Pulmonary: No audible wheeze or cough. Able to speak fully in complete sentences.   Neurological: Alert. Mentation intact and speech normal.  Psychological: mentation appears normal, affect normal/bright, judgement and insight intact, normal speech  Physical exam is limited due to Phone visit related to COVID-19     Endocrine Labs:  10/22/2018    TSH: 1.84    free T4: 0.86    FSH: 19 (1.55 - 9.74)    LH: 3.180    HbA1c: 5.0    total testosterone: 666 (>=300)    serum insulin: 4.20 (<20.00)    IGF-I: 254 (91 - 442)    IGF binding protein: 7 (2.9 - 7.2)    total cholesterol: 200    triglycerides: 71    LDL: 118    HDL: 67    1/9/20    TSH: 0.908    free T4: 0.82    HbA1c: 4.8    MRI head with and without contrast done on 10/31/18:    did not find any abnormalities in the pituitary gland     Assessment and Plan:     The patient is a 20-year-old male with history of Fanconi anemia which was diagnosed via genetic testing when his sister was also diagnosed with likening anemia after developing aplastic anemia and required hematopoietic stem cell transplant. The patient has FANCA mutation with relatively mild disease and has been asymptomatic so far without any cytopenia or need for transfusion of blood products. Follows up with Dr. Up  "Milad. He is here in endocrinology clinic for surveillance of endocrinopathies associated with Fanconi anemia    1. Risk for thyroid dysfunction:    TSH and free T4 levels are borderline low    does not report any symptoms    will order repeat TSH and free T4 levels, if levels are found to be low, he might benefit from lower dose levothyroxine therapy    2. Risk for altered glucose metabolism:    HbA1c levels have remained in the normal range, last testing done in January this year    will continue to monitor on a yearly basis    3. Risk for adrenal dysfunction:    no symptoms of hypercortisolism    no symptoms of adrenal insufficiency except intermittent episodes of dizziness    will obtain a.m. serum cortisol level    4. Risk for hypogonadism/infertility:    was able to attain puberty around age 15 to 16    has previously had high FSH level despite normal total testosterone levels, done in October 2018    patient has been counseled that he continues to remain at high risk for developing hypogonadism and will require regular surveillance    his bone growth, energy levels and overall sense of well-being might be affected if he has low testosterone levels    will obtain FSH, LH, testosterone levels    5. Risk for metabolic syndrome:    normal BMI, no history of hypertension or diabetes    has previously had mild dyslipidemia, not on any medications    Will recheck lipid profile    6. Risk for growth hormone deficiency, patient was able to attain normal stature:    patient is 5'8\" tall    despite this he continues to be at risk for developing growth hormone deficiency which might interfere with his overall wellness and energy levels    will check IGF-I levels    7. Risk for structural abnormalities of the pituitary gland:    MRI of the head was done in 2018 soon after being diagnosed with Fanconi anemia    did not show any abnormalities of pituitary gland    8. Risk for low bone mineral density:    will obtain " vitamin D 25 hydroxylase levels    DEXA scan has been ordered by his hematologist    9. Counseling for development of endocrinopathies with history of Fanconi anemia    the patient was seen today with his mother and they were explained in detail about possibilities of development of endocrine dysfunction in a patient with Fanconi anemia    even though he does not have any symptoms of endocrine dysfunction currently, he remains at a higher risk for developing them in the future    he will need regular surveillance with follow-up visits at least once a year with testing of his hormone profile    patient and his mother exhibit understanding of his condition and are willing to undergo further testing and yearly surveillance    Return to clinic in 1 year    Due to the COVID 19 pandemic this visit was a telephone/video visit in order to help prevent spread of infection in this high risk patient and the general population. The patient gave verbal consent for the visit today.    Start time: 0835  Stop time: 0930  Total time: 55 minutes    Case discussed with MD Roxanne Chilel MD  Endocrinology Fellow  Pager number [phone]    --- Addendum----  I saw the patient with endocrine fellow Dr. Dahl and discussed. Agree above note and plan.       Loren Escobar MD  Staff Physician  Endocrinology and Metabolism  [facility]  License: [id number]  Pager: [phone]

## 2020-08-03 NOTE — LETTER
8/3/2020      RE: Rommel Murphy  [address] [facility] [address]       Patient underwent a nasal endoscopy in clinic today.    Scope used: scope F - model: Olympus  / krystenet number: [id number]    Gamaliel Russell RN      Pediatric Otolaryngology and Facial Plastic Surgery    CC:   Chief Complaints and History of Present Illnesses   Patient presents with     Ent Problem     Patient is here for Fanconi Anemia.       Consulting Provider: Dr. Hammond    Date of Service: 08/03/20    I had the pleasure of meeting Rommel Murphy in consultation today as a patient at the [facility]    HPI:  Rommel is a 20 year old male who presents with concerns of symptoms related to Fanconi's anemia.  He was recently diagnosed after his sister was diagnosed and he underwent genetic testing.  His cell counts at this point have been fairly normal and he has not had a BMT yet.  States is doing quite well.  He is here from [place].  He has no new oral masses, neck masses, epistaxis, hemoptysis, otalgia, change in swallow, change in breathing, change in voice.  He does have intermittent tinnitus bilaterally that last 1 to 2 seconds and occurs 3-4 times a month.  No other symptoms associated with this including vertigo or hearing loss.      PMH:  Past Medical History:   Diagnosis Date     Fanconi's anemia (H)         PSH:  Past Surgical History:   Procedure Laterality Date     BONE MARROW BIOPSY, BONE SPECIMEN, NEEDLE/TROCAR N/A 10/1/2018    Procedure: BIOPSY BONE MARROW;  Bone Marrow Biopsy ;  Surgeon: Schroetter, Shannon J, APRN CNP;  Location: [facility] OR       Medications:    No current outpatient medications on file.       Allergies:   No Known Allergies    Social History:    Social History     Socioeconomic History     Marital status: Single     Spouse name: Not on file     Number of children: Not on file     Years of education: Not on file     Highest education level: Not on file   Occupational History  "    Not on file   Social Needs     Financial resource strain: Not on file     Food insecurity     Worry: Not on file     Inability: Not on file     Transportation needs     Medical: Not on file     Non-medical: Not on file   Tobacco Use     Smoking status: Current Some Day Smoker     Types: Other     Smokeless tobacco: Never Used     Tobacco comment: Juul Vape   Substance and Sexual Activity     Alcohol use: Yes     Comment: occasional      Drug use: Yes     Types: Marijuana     Sexual activity: Not on file   Lifestyle     Physical activity     Days per week: Not on file     Minutes per session: Not on file     Stress: Not on file   Relationships     Social connections     Talks on phone: Not on file     Gets together: Not on file     Attends Gnosticist service: Not on file     Active member of club or organization: Not on file     Attends meetings of clubs or organizations: Not on file     Relationship status: Not on file     Intimate partner violence     Fear of current or ex partner: Not on file     Emotionally abused: Not on file     Physically abused: Not on file     Forced sexual activity: Not on file   Other Topics Concern     Not on file   Social History Narrative     Not on file       FAMILY HISTORY:      Family History   Problem Relation Age of Onset     Fanconi anemia Sister        REVIEW OF SYSTEMS:  10 point ROS obtained and was negative other than the symptoms noted above in the HPI.    PHYSICAL EXAMINATION:  General: No acute distress, age appropriate behavior  Temp 98.2  F (36.8  C) (Tympanic)   Ht 1.735 m (5' 8.31\")   Wt 58.8 kg (129 lb 11.2 oz)   BMI 19.54 kg/m    HEAD: normocephalic, atraumatic  Face: symmetrical, no swelling, edema, or erythema, no facial droop  Eyes: EOMI, sclera white    Ears: Bilateral external ears normal with patent external ear canals bilaterally.   Right Ear: Tympanic membrane intact, No evidence of middle ear effusion.   Left Ear: Tympanic membrane intact, No evidence " of middle ear effusion.     Nose: No anterior drainage, intact and midline septum without perforation or hematoma     Mouth: Lips intact. No ulcers or lesions    Oropharynx:  No oral cavity lesions. Tonsils: 1+  Palate intact with normal movement  Uvula singular and midline, no oropharyngeal erythema    Neck: no significant lymphadenopathy, no cutaneous lesions  Respiratory: No respiratory distress, no stridor    Procedure: Flexible laryngoscopy  Due to Fanconi's anemia flexible laryngoscopy was indicated after the nose was topically anesthetized the scope was passed through the right nasal passage of the right inferior turbinate middle turbinate and roof of the nose were all anatomically normal nasopharynx oropharynx supraglottis glottis and piriform recesses were normal bilateral vocal cords were also mobile the scope was then passed on the left side and the inferior turbinate middle turbinate middle meatus and roof of the nose all appeared anatomically normal with no masses.    Imaging reviewed: None    Laboratory reviewed: None    Audiology reviewed: Bilateral normal hearing with type a tympanograms bilaterally.    Impressions and Recommendations:  Rommel is a 20 year old male with history of Fanconi's anemia he is doing well with no evidence of any lesions or masses.  We like to see him back in 1 years time with a repeat scope exam.               Thank you for allowing me to participate in the care of Rommel. Please don't hesitate to contact me.    Jesus Agrawal MD  Pediatric Otolaryngology and Facial Plastic Surgery  Department of Otolaryngology  [facility] [phone]   Pager [phone]   [email or web address]      The patient was seen in conjunction with Dr. Gonzalez, Otolaryngology Resident.     -------------------------------------------------------------------------------------------------  Physician Attestation    I, Jesus Agrawal, saw this patient with the resident and  agree with the resident s findings and plan of care as documented in the resident s note.      I personally reviewed vital signs, medications, labs and imaging.    Key findings: The note above is edited to reflect my history, physical, assessment and plan and I agree with the documentation    I was present with the patient, Rommel Murphy for the entire viewing portion of the endoscopy procedure (including scope insertion and withdrawal) and agree with the interpretation and report as documented by the resident.      Jesus Agrawal  Date of Service (when I saw the patient): Aug 3, 2020

## 2020-08-03 NOTE — NURSING NOTE
"Chief Complaint   Patient presents with     Ent Problem     Patient is here for Fanconi Anemia.       Temp 98.2  F (36.8  C) (Tympanic)   Ht 5' 8.31\" (173.5 cm)   Wt 129 lb 11.2 oz (58.8 kg)   BMI 19.54 kg/m      Lalo Tan, EMT  "

## 2020-08-03 NOTE — PROGRESS NOTES
Patient underwent a nasal endoscopy in clinic today.    Scope used: scope F - model: Olympus  / asset number: [id number]    Gamaliel Russell RN

## 2020-08-03 NOTE — LETTER
2020      Rommel Murphy  [address] [address]        To whom it may concern,    Patient Mr. Rommel Murphy ( 2000) has Fanconi syndrome and has relative hypogonadism.   Although his testosterone is normal range, but his FSH was elevated, showing that he has hypogonadism for his age. Therefore, I recommend to start low dose ow testosterone for this patient.       If you have any problems or concerns, please call myself or my nurse at the number above.    Sincerely,

## 2020-08-03 NOTE — LETTER
"8/3/2020       RE: Rommel Murphy  [address] [address]     Dear Colleague,    Thank you for referring your patient, Rommel Murphy, to the [facility] ENDOCRINOLOGY at [facility]. Please see a copy of my visit note below.    Opened by error.        fanconi anemia  Grace Matthew, CAMERON Murphy is a 20 year old male who is being evaluated via a billable video visit.      The patient has been notified of following:     \"This video visit will be conducted via a call between you and your physician/provider. We have found that certain health care needs can be provided without the need for an in-person physical exam.  This service lets us provide the care you need with a video conversation.  If a prescription is necessary we can send it directly to your pharmacy.  If lab work is needed we can place an order for that and you can then stop by our lab to have the test done at a later time.    Video visits are billed at different rates depending on your insurance coverage.  Please reach out to your insurance provider with any questions.    If during the course of the call the physician/provider feels a video visit is not appropriate, you will not be charged for this service.\"    Patient has given verbal consent for Video visit? Yes  How would you like to obtain your AVS? MyChart  If you are dropped from the video visit, the video invite should be resent to: Text to cell phone: [phone]  Will anyone else be joining your video visit? Yes: mother (lior). How would they like to receive their invitation? Text to cell phone: [phone]        Video-Visit Details    Type of service:  Video Visit    Video Start Time:8:35 am  End: 9:30 am    Originating Location (pt. Location): Home    Distant Location (provider location):  [facility] ENDOCRINOLOGY     Platform used for Video Visit: [facility]      Endocrinology Clinic New Consult      Rommel Murphy MRN:[id number] Date of " "Birth: 2000  Primary care provider: Christiano Hammond     Reason for Endocrine consult: surveillance for endocrinopathies in Fanconi anemia    HPI:  Rommel Murphy is a 20 year old male with PMHx of Fanconi anemia, autosomal recessive, diagnosed in 2018 when his younger sister was found to have aplastic anemia and further workup showed that she had Harpal anemia. He also underwent genetic testing and was found to have the same condition.  The patient himself has not had any significant health concerns related to Fanconi anemia. His sister underwent hematopoietic stem cell transplant. He has not developed any cytopenias. He does not have any obvious malformations. Has not required transfusions of any blood products    History related to endocrinopathies:    thyroid disease: patient has never been diagnosed to have any thyroid disease before. Denies any changes in his weight, cold or heat intolerance, changes in bowel habits, dryness of skin, hair loss, anxiety, palpitations, tremors, muscle cramps, fatigue. He reports that his energy levels are good    Stature: the patient is now 5'8\" tall. He is about to 3 inches shorter than his father and his mother is 5'3\" tall. He will is around 58 to 60 kg will body mass index around 19 to 20.    Glucose metabolism: the patient has never been diagnosed to have either high blood glucose levels are low blood glucose levels. He does have family history of diabetes and his maternal uncle. Previous HbA1c levels have been within normal limits, last one was done in January this year    features of metabolic syndrome: the patient did have mildly elevated total cholesterol and LDL levels at the time of diagnosis of Fanconi anemia in 2018. Had not been started on any medication subsequently. Has not had his cholesterol levels checked since then. Denies any family history of early heart disease. Denies any yellowish colored skin lesions around the eyes or over elbows and knees. Normal " body mass index. Denies any history of hypertension, or diabetes    puberty: the patient states that he started noticing growth of facial hair and hair over his axilla and groin around the age of 16 years. He states that he has decent amount of hair in his axillary region and in the groin area. During the video visit he is seen to have decent amount of growth over his upper lip and some over his jawline as well    infertility risk: the patient has previously had normal testosterone level with slightly elevated FSH in 2018 which had been concerning for early signs of hypogonadism. The patient had been asked to undergo semen analysis, has not been done so far. He does not report any issues with performance whenever he is with his girlfriend. He has not tried to conceive with his girlfriend yet, users contraception most of the time.      Endocrine review of system:    -Denies any polyuria, nocturia, excessive thirst or know sodium issues  -Denies polydipsia, polyphagia, blurred vision.  Denies any known history of diabetes personally  -Denies diarrhea or constipation or foul smelling stools or floating fatty stools   -Denies history of diarrhea, peptic ulcer disease or bleeding   -Denies episodic headache or unilateral headaches, denies any flushing or palpitation symptoms associated with that headache  -Denies any known thyroid issues  -Denies history of nipple discharge or gynecomastia or taking antifungal drugs  -Denies any known adrenal issues or low energy on a daily basis or nausea, vomiting or new abdominal pain  -Denies skin changes, skin stretching or tanning  -Denies pigmentation in elbows, palate or mucosa  -Denies weight changes  -Denies muscle cramps or constipation      ROS:  All 12 systems were reviewed and negative except as mentioned in HPI    Past Medical/Surgical History:  Past Medical History:   Diagnosis Date     Fanconi's anemia (H)      Past Surgical History:   Procedure Laterality Date     BONE  MARROW BIOPSY, BONE SPECIMEN, NEEDLE/TROCAR N/A 10/1/2018    Procedure: BIOPSY BONE MARROW;  Bone Marrow Biopsy ;  Surgeon: Schroetter, Shannon J, APRN CNP;  Location: [facility] OR       Allergies:  No Known Allergies    PTA Meds:  Prior to Admission medications    Not on File        Current Medications:   No current outpatient medications on file.     No current facility-administered medications for this visit.        Family History:  Family History   Problem Relation Age of Onset     Fanconi anemia Sister        Social History:  Social History     Tobacco Use     Smoking status: Current Some Day Smoker     Smokeless tobacco: Never Used   Substance Use Topics     Alcohol use: Not on file         Physical examination:  General:  Healthy, alert and oriented X3, NAD, answering questions appropriately.  HEENT: no obvious malformations noted involving the face. Appears to have normal dentition. No obvious neck swelling. Normal distribution of hair over her upper lip and jaw  Pulmonary: No audible wheeze or cough. Able to speak fully in complete sentences.   Neurological: Alert. Mentation intact and speech normal.  Psychological: mentation appears normal, affect normal/bright, judgement and insight intact, normal speech  Physical exam is limited due to Phone visit related to COVID-19     Endocrine Labs:  10/22/2018    TSH: 1.84    free T4: 0.86    FSH: 19 (1.55 - 9.74)    LH: 3.180    HbA1c: 5.0    total testosterone: 666 (>=300)    serum insulin: 4.20 (<20.00)    IGF-I: 254 (91 - 442)    IGF binding protein: 7 (2.9 - 7.2)    total cholesterol: 200    triglycerides: 71    LDL: 118    HDL: 67    1/9/20    TSH: 0.908    free T4: 0.82    HbA1c: 4.8    MRI head with and without contrast done on 10/31/18:    did not find any abnormalities in the pituitary gland     Assessment and Plan:     The patient is a 20-year-old male with history of Fanconi anemia which was diagnosed via genetic testing when his sister was also diagnosed with  "likening anemia after developing aplastic anemia and required hematopoietic stem cell transplant. The patient has FANCA mutation with relatively mild disease and has been asymptomatic so far without any cytopenia or need for transfusion of blood products. Follows up with Dr. Jeovanny Stinson. He is here in endocrinology clinic for surveillance of endocrinopathies associated with Fanconi anemia    1. Risk for thyroid dysfunction:    TSH and free T4 levels are borderline low    does not report any symptoms    will order repeat TSH and free T4 levels, if levels are found to be low, he might benefit from lower dose levothyroxine therapy    2. Risk for altered glucose metabolism:    HbA1c levels have remained in the normal range, last testing done in January this year    will continue to monitor on a yearly basis    3. Risk for adrenal dysfunction:    no symptoms of hypercortisolism    no symptoms of adrenal insufficiency except intermittent episodes of dizziness    will obtain a.m. serum cortisol level    4. Risk for hypogonadism/infertility:    was able to attain puberty around age 15 to 16    has previously had high FSH level despite normal total testosterone levels, done in October 2018    patient has been counseled that he continues to remain at high risk for developing hypogonadism and will require regular surveillance    his bone growth, energy levels and overall sense of well-being might be affected if he has low testosterone levels    will obtain FSH, LH, testosterone levels    5. Risk for metabolic syndrome:    normal BMI, no history of hypertension or diabetes    has previously had mild dyslipidemia, not on any medications    Will recheck lipid profile    6. Risk for growth hormone deficiency, patient was able to attain normal stature:    patient is 5'8\" tall    despite this he continues to be at risk for developing growth hormone deficiency which might interfere with his overall wellness and energy levels    will " check IGF-I levels    7. Risk for structural abnormalities of the pituitary gland:    MRI of the head was done in 2018 soon after being diagnosed with Fanconi anemia    did not show any abnormalities of pituitary gland    8. Risk for low bone mineral density:    will obtain vitamin D 25 hydroxylase levels    DEXA scan has been ordered by his hematologist    9. Counseling for development of endocrinopathies with history of Fanconi anemia    the patient was seen today with his mother and they were explained in detail about possibilities of development of endocrine dysfunction in a patient with Fanconi anemia    even though he does not have any symptoms of endocrine dysfunction currently, he remains at a higher risk for developing them in the future    he will need regular surveillance with follow-up visits at least once a year with testing of his hormone profile    patient and his mother exhibit understanding of his condition and are willing to undergo further testing and yearly surveillance    Return to clinic in 1 year    Due to the COVID 19 pandemic this visit was a telephone/video visit in order to help prevent spread of infection in this high risk patient and the general population. The patient gave verbal consent for the visit today.    Start time: 0835  Stop time: 0930  Total time: 55 minutes    Case discussed with MD Roxanne Chilel MD  Endocrinology Fellow  Pager number [phone]    --- Addendum----  I saw the patient with endocrine fellow Dr. Dahl and discussed. Agree above note and plan.       Loren Escobar MD  Staff Physician  Endocrinology and Metabolism  [facility]  License: [place] [address]  Pager: [phone]

## 2020-08-03 NOTE — Clinical Note
August 3, 2020        Rommel Murphy  [address] [facility] [address]    COVID-19 Virus PCR to [facility] - Result   Date Value Ref Range Status   08/03/2020   Final    Test received-See reflex to IDDL test SARS CoV2 (COVID-19) Virus RT-PCR       SARS-CoV-2 PCR Result   Date Value Ref Range Status   08/03/2020 NEGATIVE  Final     Comment:     SARS-CoV2 (COVID-19) RNA not detected, presumed negative.       This letter provides a written record that you were tested for COVID-19.      Your result was negative. This means that we didn t find the virus that causes COVID-19 in your sample. A test may show negative when you do actually have the virus. This can happen when the virus is in the early stages of infection, before you feel illness symptoms.    If you have symptoms   Stay home and away from others (self-isolate) until you meet ALL of the guidelines below:    You ve had no fever--and no medicine that reduces fever--for 3 full days (72 hours). And      Your other symptoms have gotten better. For example, your cough or breathing has improved. And     At least 10 days have passed since your symptoms started.    During this time:    Stay home. Don t go to work, school or anywhere else.     Stay in your own room, including for meals. Use your own bathroom if you can.    Stay away from others in your home. No hugging, kissing or shaking hands. No visitors.    Clean  high touch  surfaces often (doorknobs, counters, handles, etc.). Use a household cleaning spray or wipes. You can find a full list on the EPA website at www.epa.gov/pesticide-registration/list-n-disinfectants-use-against-sars-cov-2.    Cover your mouth and nose with a mask, tissue or washcloth to avoid spreading germs.    Wash your hands and face often with soap and water.    Going back to work  Check with your employer for any guidelines to follow for going back to work.    Employers: This document serves as formal notice that your employee tested  negative for COVID-19, as of the testing date shown above.

## 2020-08-03 NOTE — PROGRESS NOTES
Pediatric Otolaryngology and Facial Plastic Surgery    CC:   Chief Complaints and History of Present Illnesses   Patient presents with     Ent Problem     Patient is here for Fanconi Anemia.       Consulting Provider: Dr. Hammond    Date of Service: 08/03/20    I had the pleasure of meeting Rommel Murphy in consultation today as a patient at the [facility]    HPI:  Rommel is a 20 year old male who presents with concerns of symptoms related to Fanconi's anemia.  He was recently diagnosed after his sister was diagnosed and he underwent genetic testing.  His cell counts at this point have been fairly normal and he has not had a BMT yet.  States is doing quite well.  He is here from [place].  He has no new oral masses, neck masses, epistaxis, hemoptysis, otalgia, change in swallow, change in breathing, change in voice.  He does have intermittent tinnitus bilaterally that last 1 to 2 seconds and occurs 3-4 times a month.  No other symptoms associated with this including vertigo or hearing loss.      PMH:  Past Medical History:   Diagnosis Date     Fanconi's anemia (H)         PSH:  Past Surgical History:   Procedure Laterality Date     BONE MARROW BIOPSY, BONE SPECIMEN, NEEDLE/TROCAR N/A 10/1/2018    Procedure: BIOPSY BONE MARROW;  Bone Marrow Biopsy ;  Surgeon: Schroetter, Shannon J, APRN CNP;  Location:  OR       Medications:    No current outpatient medications on file.       Allergies:   No Known Allergies    Social History:    Social History     Socioeconomic History     Marital status: Single     Spouse name: Not on file     Number of children: Not on file     Years of education: Not on file     Highest education level: Not on file   Occupational History     Not on file   Social Needs     Financial resource strain: Not on file     Food insecurity     Worry: Not on file     Inability: Not on file     Transportation needs     Medical: Not on file     Non-medical: Not on file  "  Tobacco Use     Smoking status: Current Some Day Smoker     Types: Other     Smokeless tobacco: Never Used     Tobacco comment: Juul Vape   Substance and Sexual Activity     Alcohol use: Yes     Comment: occasional      Drug use: Yes     Types: Marijuana     Sexual activity: Not on file   Lifestyle     Physical activity     Days per week: Not on file     Minutes per session: Not on file     Stress: Not on file   Relationships     Social connections     Talks on phone: Not on file     Gets together: Not on file     Attends Taoism service: Not on file     Active member of club or organization: Not on file     Attends meetings of clubs or organizations: Not on file     Relationship status: Not on file     Intimate partner violence     Fear of current or ex partner: Not on file     Emotionally abused: Not on file     Physically abused: Not on file     Forced sexual activity: Not on file   Other Topics Concern     Not on file   Social History Narrative     Not on file       FAMILY HISTORY:      Family History   Problem Relation Age of Onset     Fanconi anemia Sister        REVIEW OF SYSTEMS:  10 point ROS obtained and was negative other than the symptoms noted above in the HPI.    PHYSICAL EXAMINATION:  General: No acute distress, age appropriate behavior  Temp 98.2  F (36.8  C) (Tympanic)   Ht 1.735 m (5' 8.31\")   Wt 58.8 kg (129 lb 11.2 oz)   BMI 19.54 kg/m    HEAD: normocephalic, atraumatic  Face: symmetrical, no swelling, edema, or erythema, no facial droop  Eyes: EOMI, sclera white    Ears: Bilateral external ears normal with patent external ear canals bilaterally.   Right Ear: Tympanic membrane intact, No evidence of middle ear effusion.   Left Ear: Tympanic membrane intact, No evidence of middle ear effusion.     Nose: No anterior drainage, intact and midline septum without perforation or hematoma     Mouth: Lips intact. No ulcers or lesions    Oropharynx:  No oral cavity lesions. Tonsils: 1+  Palate " intact with normal movement  Uvula singular and midline, no oropharyngeal erythema    Neck: no significant lymphadenopathy, no cutaneous lesions  Respiratory: No respiratory distress, no stridor    Procedure: Flexible laryngoscopy  Due to Fanconi's anemia flexible laryngoscopy was indicated after the nose was topically anesthetized the scope was passed through the right nasal passage of the right inferior turbinate middle turbinate and roof of the nose were all anatomically normal nasopharynx oropharynx supraglottis glottis and piriform recesses were normal bilateral vocal cords were also mobile the scope was then passed on the left side and the inferior turbinate middle turbinate middle meatus and roof of the nose all appeared anatomically normal with no masses.    Imaging reviewed: None    Laboratory reviewed: None    Audiology reviewed: Bilateral normal hearing with type a tympanograms bilaterally.    Impressions and Recommendations:  Rommel is a 20 year old male with history of Fanconi's anemia he is doing well with no evidence of any lesions or masses.  We like to see him back in 1 years time with a repeat scope exam.               Thank you for allowing me to participate in the care of Rommel. Please don't hesitate to contact me.    Jesus Agrawal MD  Pediatric Otolaryngology and Facial Plastic Surgery  Department of Otolaryngology  [facility]   Clinic [phone]   Pager [phone]   [email or web address]      The patient was seen in conjunction with Dr. Gonzalez, Otolaryngology Resident.     -------------------------------------------------------------------------------------------------  Physician Attestation    I, Jesus Agrawal, saw this patient with the resident and agree with the resident s findings and plan of care as documented in the resident s note.      I personally reviewed vital signs, medications, labs and imaging.    Key findings: The note above is edited to reflect my  history, physical, assessment and plan and I agree with the documentation    I was present with the patient, Rommel Murphy for the entire viewing portion of the endoscopy procedure (including scope insertion and withdrawal) and agree with the interpretation and report as documented by the resident.      Jesus Agrawal  Date of Service (when I saw the patient): Aug 3, 2020

## 2020-08-03 NOTE — PATIENT INSTRUCTIONS
1.  You were seen in the ENT Clinic today by Dr. Agrawal. If you have any questions or concerns after your appointment, please call [phone].    2.  Plan is to return to clinic in one year.     Thank you for allowing us to participate in your care!  Gamaliel Russell RN Care Coordinator  [facility]

## 2020-08-03 NOTE — PROGRESS NOTES
AUDIOLOGY REPORT    SUMMARY: Audiology visit completed. See audiogram for results.      RECOMMENDATIONS: Follow-up with ENT.      August Miller  Clinical Audiologist, [place] #[id number]

## 2020-08-04 ENCOUNTER — HOSPITAL ENCOUNTER (OUTPATIENT)
Facility: CLINIC | Age: 20
Discharge: HOME | End: 2020-08-04
Attending: NURSE PRACTITIONER | Admitting: NURSE PRACTITIONER
Payer: COMMERCIAL

## 2020-08-04 ENCOUNTER — HOSPITAL ENCOUNTER (OUTPATIENT)
Dept: GENERAL RADIOLOGY | Facility: CLINIC | Age: 20
End: 2020-08-04
Attending: PEDIATRICS | Admitting: PHYSICIAN ASSISTANT
Payer: COMMERCIAL

## 2020-08-04 ENCOUNTER — ANCILLARY PROCEDURE (OUTPATIENT)
Dept: BONE DENSITY | Facility: CLINIC | Age: 20
End: 2020-08-04
Attending: PEDIATRICS
Payer: COMMERCIAL

## 2020-08-04 ENCOUNTER — CARE COORDINATION (OUTPATIENT)
Dept: TRANSPLANT | Facility: CLINIC | Age: 20
End: 2020-08-04

## 2020-08-04 ENCOUNTER — ANESTHESIA EVENT (OUTPATIENT)
Dept: PEDIATRICS | Facility: CLINIC | Age: 20
End: 2020-08-04
Payer: COMMERCIAL

## 2020-08-04 ENCOUNTER — ANESTHESIA (OUTPATIENT)
Dept: PEDIATRICS | Facility: CLINIC | Age: 20
End: 2020-08-04
Payer: COMMERCIAL

## 2020-08-04 ENCOUNTER — ONCOLOGY VISIT (OUTPATIENT)
Dept: TRANSPLANT | Facility: CLINIC | Age: 20
End: 2020-08-04
Attending: PEDIATRICS
Payer: COMMERCIAL

## 2020-08-04 ENCOUNTER — HOSPITAL ENCOUNTER (OUTPATIENT)
Dept: MRI IMAGING | Facility: CLINIC | Age: 20
End: 2020-08-04
Attending: PEDIATRICS | Admitting: PHYSICIAN ASSISTANT
Payer: COMMERCIAL

## 2020-08-04 VITALS
RESPIRATION RATE: 13 BRPM | TEMPERATURE: 97.3 F | SYSTOLIC BLOOD PRESSURE: 106 MMHG | OXYGEN SATURATION: 98 % | BODY MASS INDEX: 19.1 KG/M2 | DIASTOLIC BLOOD PRESSURE: 61 MMHG | HEART RATE: 54 BPM | WEIGHT: 126.76 LBS

## 2020-08-04 DIAGNOSIS — D61.03 FANCONI'S ANEMIA: Primary | ICD-10-CM

## 2020-08-04 DIAGNOSIS — D61.03 FANCONI'S ANEMIA: ICD-10-CM

## 2020-08-04 LAB
ALBUMIN SERPL-MCNC: 4.5 G/DL (ref 3.4–5)
ALP SERPL-CCNC: 79 U/L (ref 40–150)
ALT SERPL W P-5'-P-CCNC: 31 U/L (ref 0–70)
ANION GAP SERPL CALCULATED.3IONS-SCNC: 7 MMOL/L (ref 3–14)
AST SERPL W P-5'-P-CCNC: 22 U/L (ref 0–45)
BASOPHILS # BLD AUTO: 0.1 10E9/L (ref 0–0.2)
BASOPHILS NFR BLD AUTO: 1.4 %
BILIRUB SERPL-MCNC: 0.9 MG/DL (ref 0.2–1.3)
BUN SERPL-MCNC: 17 MG/DL (ref 7–30)
CALCIUM SERPL-MCNC: 9.5 MG/DL (ref 8.5–10.1)
CHLORIDE SERPL-SCNC: 104 MMOL/L (ref 94–109)
CHOLEST SERPL-MCNC: 187 MG/DL
CO2 SERPL-SCNC: 27 MMOL/L (ref 20–32)
CORTIS SERPL-MCNC: 20.6 UG/DL (ref 4–22)
CREAT SERPL-MCNC: 1.11 MG/DL (ref 0.66–1.25)
DEPRECATED CALCIDIOL+CALCIFEROL SERPL-MC: 37 UG/L (ref 20–75)
DIFFERENTIAL METHOD BLD: ABNORMAL
EOSINOPHIL # BLD AUTO: 0.3 10E9/L (ref 0–0.7)
EOSINOPHIL NFR BLD AUTO: 9.1 %
ERYTHROCYTE [DISTWIDTH] IN BLOOD BY AUTOMATED COUNT: 12.4 % (ref 10–15)
FSH SERPL-ACNC: 22.3 IU/L (ref 0.7–10.8)
GFR SERPL CREATININE-BSD FRML MDRD: >90 ML/MIN/{1.73_M2}
GGT SERPL-CCNC: 67 U/L (ref 0–75)
GLUCOSE SERPL-MCNC: 86 MG/DL (ref 70–99)
HBA1C MFR BLD: 4.7 % (ref 0–5.6)
HCT VFR BLD AUTO: 46.8 % (ref 40–53)
HDLC SERPL-MCNC: 71 MG/DL
HGB BLD-MCNC: 15.5 G/DL (ref 13.3–17.7)
IGF-I BLD-MCNC: 251 NG/ML (ref 120–338)
IMM GRANULOCYTES # BLD: 0 10E9/L (ref 0–0.4)
IMM GRANULOCYTES NFR BLD: 0.3 %
LDLC SERPL CALC-MCNC: 92 MG/DL
LH SERPL-ACNC: 4.2 IU/L (ref 1.5–9.3)
LYMPHOCYTES # BLD AUTO: 1.3 10E9/L (ref 0.8–5.3)
LYMPHOCYTES NFR BLD AUTO: 35.7 %
MCH RBC QN AUTO: 32.5 PG (ref 26.5–33)
MCHC RBC AUTO-ENTMCNC: 33.1 G/DL (ref 31.5–36.5)
MCV RBC AUTO: 98 FL (ref 78–100)
MONOCYTES # BLD AUTO: 0.6 10E9/L (ref 0–1.3)
MONOCYTES NFR BLD AUTO: 15.9 %
NEUTROPHILS # BLD AUTO: 1.3 10E9/L (ref 1.6–8.3)
NEUTROPHILS NFR BLD AUTO: 37.6 %
NONHDLC SERPL-MCNC: 116 MG/DL
NRBC # BLD AUTO: 0 10*3/UL
NRBC BLD AUTO-RTO: 0 /100
PLATELET # BLD AUTO: 223 10E9/L (ref 150–450)
POTASSIUM SERPL-SCNC: 3.9 MMOL/L (ref 3.4–5.3)
PROT SERPL-MCNC: 8.3 G/DL (ref 6.8–8.8)
RBC # BLD AUTO: 4.77 10E12/L (ref 4.4–5.9)
RESEARCH KIT COLLECTION: NORMAL
SODIUM SERPL-SCNC: 138 MMOL/L (ref 133–144)
T4 FREE SERPL-MCNC: 0.88 NG/DL (ref 0.76–1.46)
TRIGL SERPL-MCNC: 122 MG/DL
TSH SERPL DL<=0.005 MIU/L-ACNC: 1.27 MU/L (ref 0.4–4)
WBC # BLD AUTO: 3.5 10E9/L (ref 4–11)

## 2020-08-04 PROCEDURE — 37000009 ZZH ANESTHESIA TECHNICAL FEE, EACH ADDTL 15 MIN: Performed by: NURSE PRACTITIONER

## 2020-08-04 PROCEDURE — 85025 COMPLETE CBC W/AUTO DIFF WBC: CPT | Performed by: INTERNAL MEDICINE

## 2020-08-04 PROCEDURE — 40000611 ZZHCL STATISTIC MORPHOLOGY W/INTERP HEMEPATH TC 85060: Performed by: NURSE PRACTITIONER

## 2020-08-04 PROCEDURE — 88271 CYTOGENETICS DNA PROBE: CPT | Performed by: NURSE PRACTITIONER

## 2020-08-04 PROCEDURE — 77072 BONE AGE STUDIES: CPT

## 2020-08-04 PROCEDURE — 00000161 ZZHCL STATISTIC H-SPHEME PROCESS B/S: Performed by: NURSE PRACTITIONER

## 2020-08-04 PROCEDURE — 88275 CYTOGENETICS 100-300: CPT | Mod: 91 | Performed by: NURSE PRACTITIONER

## 2020-08-04 PROCEDURE — 88280 CHROMOSOME KARYOTYPE STUDY: CPT | Performed by: NURSE PRACTITIONER

## 2020-08-04 PROCEDURE — 38222 DX BONE MARROW BX & ASPIR: CPT | Performed by: NURSE PRACTITIONER

## 2020-08-04 PROCEDURE — 84403 ASSAY OF TOTAL TESTOSTERONE: CPT | Performed by: INTERNAL MEDICINE

## 2020-08-04 PROCEDURE — 84270 ASSAY OF SEX HORMONE GLOBUL: CPT | Performed by: INTERNAL MEDICINE

## 2020-08-04 PROCEDURE — 36415 COLL VENOUS BLD VENIPUNCTURE: CPT | Performed by: INTERNAL MEDICINE

## 2020-08-04 PROCEDURE — 40000951 ZZHCL STATISTIC BONE MARROW INTERP TC 85097: Performed by: NURSE PRACTITIONER

## 2020-08-04 PROCEDURE — 82306 VITAMIN D 25 HYDROXY: CPT | Performed by: INTERNAL MEDICINE

## 2020-08-04 PROCEDURE — 88237 TISSUE CULTURE BONE MARROW: CPT | Performed by: NURSE PRACTITIONER

## 2020-08-04 PROCEDURE — 88185 FLOWCYTOMETRY/TC ADD-ON: CPT | Performed by: NURSE PRACTITIONER

## 2020-08-04 PROCEDURE — 25000132 ZZH RX MED GY IP 250 OP 250 PS 637: Performed by: ANESTHESIOLOGY

## 2020-08-04 PROCEDURE — 80053 COMPREHEN METABOLIC PANEL: CPT | Performed by: INTERNAL MEDICINE

## 2020-08-04 PROCEDURE — 83520 IMMUNOASSAY QUANT NOS NONAB: CPT | Performed by: INTERNAL MEDICINE

## 2020-08-04 PROCEDURE — 88184 FLOWCYTOMETRY/ TC 1 MARKER: CPT | Performed by: NURSE PRACTITIONER

## 2020-08-04 PROCEDURE — 88341 IMHCHEM/IMCYTCHM EA ADD ANTB: CPT | Performed by: NURSE PRACTITIONER

## 2020-08-04 PROCEDURE — 82977 ASSAY OF GGT: CPT | Performed by: INTERNAL MEDICINE

## 2020-08-04 PROCEDURE — 82306 VITAMIN D 25 HYDROXY: CPT | Mod: 91 | Performed by: INTERNAL MEDICINE

## 2020-08-04 PROCEDURE — 40001005 ZZHCL STATISTIC FLOW >15 ABY TC 88189: Performed by: NURSE PRACTITIONER

## 2020-08-04 PROCEDURE — 88311 DECALCIFY TISSUE: CPT | Performed by: NURSE PRACTITIONER

## 2020-08-04 PROCEDURE — 84305 ASSAY OF SOMATOMEDIN: CPT | Performed by: INTERNAL MEDICINE

## 2020-08-04 PROCEDURE — 25000128 H RX IP 250 OP 636: Performed by: NURSE ANESTHETIST, CERTIFIED REGISTERED

## 2020-08-04 PROCEDURE — 88305 TISSUE EXAM BY PATHOLOGIST: CPT | Performed by: NURSE PRACTITIONER

## 2020-08-04 PROCEDURE — 25800030 ZZH RX IP 258 OP 636: Performed by: NURSE ANESTHETIST, CERTIFIED REGISTERED

## 2020-08-04 PROCEDURE — 27210134 ZZH KIT BIOPSY BONE MARROW: Performed by: NURSE PRACTITIONER

## 2020-08-04 PROCEDURE — 88342 IMHCHEM/IMCYTCHM 1ST ANTB: CPT | Performed by: NURSE PRACTITIONER

## 2020-08-04 PROCEDURE — 77080 DXA BONE DENSITY AXIAL: CPT

## 2020-08-04 PROCEDURE — 25000125 ZZHC RX 250: Performed by: NURSE ANESTHETIST, CERTIFIED REGISTERED

## 2020-08-04 PROCEDURE — 40000165 ZZH STATISTIC POST-PROCEDURE RECOVERY CARE: Performed by: NURSE PRACTITIONER

## 2020-08-04 PROCEDURE — 40000937 ZZHCL STATISTIC RESEARCH KIT COLLECTION: Performed by: PEDIATRICS

## 2020-08-04 PROCEDURE — 88161 CYTOPATH SMEAR OTHER SOURCE: CPT | Performed by: NURSE PRACTITIONER

## 2020-08-04 PROCEDURE — 83036 HEMOGLOBIN GLYCOSYLATED A1C: CPT | Performed by: INTERNAL MEDICINE

## 2020-08-04 PROCEDURE — 40001011 ZZH STATISTIC PRE-PROCEDURE NURSING ASSESSMENT: Performed by: NURSE PRACTITIONER

## 2020-08-04 PROCEDURE — 82533 TOTAL CORTISOL: CPT | Performed by: INTERNAL MEDICINE

## 2020-08-04 PROCEDURE — 25000125 ZZHC RX 250: Performed by: NURSE PRACTITIONER

## 2020-08-04 PROCEDURE — 70551 MRI BRAIN STEM W/O DYE: CPT

## 2020-08-04 PROCEDURE — 84439 ASSAY OF FREE THYROXINE: CPT | Performed by: INTERNAL MEDICINE

## 2020-08-04 PROCEDURE — 83001 ASSAY OF GONADOTROPIN (FSH): CPT | Performed by: INTERNAL MEDICINE

## 2020-08-04 PROCEDURE — 88264 CHROMOSOME ANALYSIS 20-25: CPT | Performed by: NURSE PRACTITIONER

## 2020-08-04 PROCEDURE — 80061 LIPID PANEL: CPT | Performed by: INTERNAL MEDICINE

## 2020-08-04 PROCEDURE — 37000008 ZZH ANESTHESIA TECHNICAL FEE, 1ST 30 MIN: Performed by: NURSE PRACTITIONER

## 2020-08-04 PROCEDURE — 84443 ASSAY THYROID STIM HORMONE: CPT | Performed by: INTERNAL MEDICINE

## 2020-08-04 PROCEDURE — 83002 ASSAY OF GONADOTROPIN (LH): CPT | Performed by: INTERNAL MEDICINE

## 2020-08-04 RX ORDER — FENTANYL CITRATE 50 UG/ML
INJECTION, SOLUTION INTRAMUSCULAR; INTRAVENOUS PRN
Status: DISCONTINUED | OUTPATIENT
Start: 2020-08-04 | End: 2020-08-04

## 2020-08-04 RX ORDER — OXYCODONE HYDROCHLORIDE 5 MG/1
5 TABLET ORAL EVERY 6 HOURS PRN
Qty: 4 TABLET | Refills: 0 | Status: SHIPPED | OUTPATIENT
Start: 2020-08-04

## 2020-08-04 RX ORDER — OXYCODONE HYDROCHLORIDE 5 MG/1
TABLET ORAL
Status: DISCONTINUED
Start: 2020-08-04 | End: 2020-08-04 | Stop reason: HOSPADM

## 2020-08-04 RX ORDER — OXYCODONE HYDROCHLORIDE 5 MG/1
5 TABLET ORAL EVERY 4 HOURS PRN
Status: DISCONTINUED | OUTPATIENT
Start: 2020-08-04 | End: 2020-08-04 | Stop reason: HOSPADM

## 2020-08-04 RX ORDER — SODIUM CHLORIDE, SODIUM LACTATE, POTASSIUM CHLORIDE, CALCIUM CHLORIDE 600; 310; 30; 20 MG/100ML; MG/100ML; MG/100ML; MG/100ML
INJECTION, SOLUTION INTRAVENOUS CONTINUOUS PRN
Status: DISCONTINUED | OUTPATIENT
Start: 2020-08-04 | End: 2020-08-04

## 2020-08-04 RX ORDER — ACETAMINOPHEN 325 MG/1
650 TABLET ORAL EVERY 4 HOURS PRN
Status: DISCONTINUED | OUTPATIENT
Start: 2020-08-04 | End: 2020-08-04

## 2020-08-04 RX ORDER — ACETAMINOPHEN 325 MG/1
650 TABLET ORAL EVERY 4 HOURS PRN
Status: DISCONTINUED | OUTPATIENT
Start: 2020-08-04 | End: 2020-08-04 | Stop reason: HOSPADM

## 2020-08-04 RX ORDER — ACETAMINOPHEN 325 MG/1
TABLET ORAL
Status: DISCONTINUED
Start: 2020-08-04 | End: 2020-08-04 | Stop reason: HOSPADM

## 2020-08-04 RX ORDER — LIDOCAINE HYDROCHLORIDE 20 MG/ML
INJECTION, SOLUTION INFILTRATION; PERINEURAL PRN
Status: DISCONTINUED | OUTPATIENT
Start: 2020-08-04 | End: 2020-08-04

## 2020-08-04 RX ORDER — PROPOFOL 10 MG/ML
INJECTION, EMULSION INTRAVENOUS CONTINUOUS PRN
Status: DISCONTINUED | OUTPATIENT
Start: 2020-08-04 | End: 2020-08-04

## 2020-08-04 RX ORDER — PROPOFOL 10 MG/ML
INJECTION, EMULSION INTRAVENOUS PRN
Status: DISCONTINUED | OUTPATIENT
Start: 2020-08-04 | End: 2020-08-04

## 2020-08-04 RX ORDER — ONDANSETRON 2 MG/ML
INJECTION INTRAMUSCULAR; INTRAVENOUS PRN
Status: DISCONTINUED | OUTPATIENT
Start: 2020-08-04 | End: 2020-08-04

## 2020-08-04 RX ADMIN — FENTANYL CITRATE 25 MCG: 50 INJECTION, SOLUTION INTRAMUSCULAR; INTRAVENOUS at 11:40

## 2020-08-04 RX ADMIN — PROPOFOL 300 MCG/KG/MIN: 10 INJECTION, EMULSION INTRAVENOUS at 11:38

## 2020-08-04 RX ADMIN — FENTANYL CITRATE 25 MCG: 50 INJECTION, SOLUTION INTRAMUSCULAR; INTRAVENOUS at 11:47

## 2020-08-04 RX ADMIN — PROPOFOL 30 MG: 10 INJECTION, EMULSION INTRAVENOUS at 11:39

## 2020-08-04 RX ADMIN — ONDANSETRON 4 MG: 2 INJECTION INTRAMUSCULAR; INTRAVENOUS at 11:52

## 2020-08-04 RX ADMIN — LIDOCAINE HYDROCHLORIDE 50 MG: 20 INJECTION, SOLUTION INFILTRATION; PERINEURAL at 11:38

## 2020-08-04 RX ADMIN — SODIUM CHLORIDE, POTASSIUM CHLORIDE, SODIUM LACTATE AND CALCIUM CHLORIDE: 600; 310; 30; 20 INJECTION, SOLUTION INTRAVENOUS at 11:38

## 2020-08-04 RX ADMIN — PROPOFOL 60 MG: 10 INJECTION, EMULSION INTRAVENOUS at 11:38

## 2020-08-04 RX ADMIN — MIDAZOLAM 2 MG: 1 INJECTION INTRAMUSCULAR; INTRAVENOUS at 11:31

## 2020-08-04 RX ADMIN — ACETAMINOPHEN 650 MG: 325 TABLET, FILM COATED ORAL at 12:55

## 2020-08-04 RX ADMIN — OXYCODONE HYDROCHLORIDE 5 MG: 5 TABLET ORAL at 12:56

## 2020-08-04 NOTE — ANESTHESIA CARE TRANSFER NOTE
Patient: Rommel Murphy    Procedure(s):  Bone marrow biopsy    Diagnosis: Fanconi's anemia (H) [D61.09]  Diagnosis Additional Information: No value filed.    Anesthesia Type:   General     Note:  Airway :Nasal Cannula  Patient transferred to: Recovery  Handoff Report: Identifed the Patient, Identified the Reponsible Provider, Reviewed the pertinent medical history, Discussed the surgical course, Reviewed Intra-OP anesthesia mangement and issues during anesthesia, Set expectations for post-procedure period and Allowed opportunity for questions and acknowledgement of understanding      Vitals: (Last set prior to Anesthesia Care Transfer)    CRNA VITALS  8/4/2020 1130 - 8/4/2020 1210      8/4/2020             NIBP:  90/40    Pulse:  54    Temp:  36.3  C (97.3  F)    SpO2:  99 %    Resp Rate (observed):  15                Electronically Signed By: TIESHA Jenkins CRNA  August 4, 2020  12:10 PM

## 2020-08-04 NOTE — ANESTHESIA PREPROCEDURE EVALUATION
"Anesthesia Pre-Procedure Evaluation    Patient: Rommel Murphy   MRN:     [id number] Gender:   male   Age:    20 year old :      2000        Preoperative Diagnosis: Fanconi's anemia (H) [D61.09]   Procedure(s):  Bone marrow biopsy     LABS:  CBC:   Lab Results   Component Value Date    WBC 3.5 (L) 2020    WBC 3.6 (L) 10/01/2018    HGB 15.5 2020    HGB 14.0 10/01/2018    HCT 46.8 2020    HCT 42.7 10/01/2018     2020     10/01/2018     BMP:   Lab Results   Component Value Date     2020    POTASSIUM 3.9 2020    CHLORIDE 104 2020    CO2 27 2020    BUN 17 2020    CR 1.11 2020    GLC 86 2020     COAGS: No results found for: PTT, INR, FIBR  POC:   Lab Results   Component Value Date    BGM 90 10/01/2018     OTHER:   Lab Results   Component Value Date    A1C 4.7 2020    MIKO 9.5 2020    ALBUMIN 4.5 2020    PROTTOTAL 8.3 2020    ALT 31 2020    AST 22 2020    GGT 67 2020    ALKPHOS 79 2020    BILITOTAL 0.9 2020    TSH 1.27 2020    T4 0.88 2020        Preop Vitals    BP Readings from Last 3 Encounters:   20 (!) 146/75   10/01/18 123/83   10/01/18 128/76    Pulse Readings from Last 3 Encounters:   20 56   10/01/18 69   10/01/18 81      Resp Readings from Last 3 Encounters:   20 16   10/01/18 16   10/01/18 18    SpO2 Readings from Last 3 Encounters:   20 100%   10/01/18 98%   10/01/18 99%      Temp Readings from Last 1 Encounters:   20 36.4  C (97.5  F) (Oral)    Ht Readings from Last 1 Encounters:   20 1.735 m (5' 8.31\")      Wt Readings from Last 1 Encounters:   20 57.5 kg (126 lb 12.2 oz)    Estimated body mass index is 19.1 kg/m  as calculated from the following:    Height as of 8/3/20: 1.735 m (5' 8.31\").    Weight as of this encounter: 57.5 kg (126 lb 12.2 oz).     LDA:  Peripheral IV 20 Right Upper forearm (Active) "   Site Assessment WDL 08/04/20 1052   Line Status Saline locked 08/04/20 1052   Number of days: 0        Past Medical History:   Diagnosis Date     Fanconi's anemia (H)       Past Surgical History:   Procedure Laterality Date     BONE MARROW BIOPSY, BONE SPECIMEN, NEEDLE/TROCAR N/A 10/1/2018    Procedure: BIOPSY BONE MARROW;  Bone Marrow Biopsy ;  Surgeon: Schroetter, Shannon J, APRN CNP;  [facility]      No Known Allergies     Anesthesia Evaluation     .      No history of anesthetic complications (No known family hx of anesthetic complications)          ROS/MED HX    ENT/Pulmonary:  - neg pulmonary ROS     Neurologic:  - neg neurologic ROS     Cardiovascular:  - neg cardiovascular ROS       METS/Exercise Tolerance:  >4 METS   Hematologic: Comments:   - Positive genetic testing for fanconi anemia. Mild leukopenia.  Hgb 13.2  Plt 185          Musculoskeletal:  - neg musculoskeletal ROS       GI/Hepatic:  - neg GI/hepatic ROS      (-) GERD   Renal/Genitourinary:  - ROS Renal section negative       Endo:  - neg endo ROS       Psychiatric:  - neg psychiatric ROS       Infectious Disease:  - neg infectious disease ROS       Malignancy:      - no malignancy   Other:    - neg other ROS                     PHYSICAL EXAM:   Mental Status/Neuro: A/A/O   Airway: Facies: Feasible  Mallampati: I  Mouth/Opening: Full  TM distance: > 6 cm  Neck ROM: Full   Respiratory: Auscultation: CTAB     Resp. Rate: Normal     Resp. Effort: Normal      CV: Rhythm: Regular  Rate: Age appropriate  Heart: Normal Sounds  Edema: None   Comments:      Dental: Normal Dentition                Assessment:   ASA SCORE: 2      Smoking Status:  Non-Smoker/Unknown   NPO Status: NPO Appropriate     Plan:   Anes. Type:  General   Pre-Medication: None   Induction:  IV (Standard)   Airway: Native Airway   Access/Monitoring: PIV   Maintenance: Propofol Sedation     Postop Plan:   Postop Pain: None  Postop Sedation/Airway: Not planned  Disposition:  Outpatient     PONV Management:   Adult Risk Factors:, Non-Smoker   Prevention: Ondansetron, Propofol     CONSENT: Direct conversation   Plan and risks discussed with: Patient; Mother   Blood Products: Consent Deferred (Minimal Blood Loss)                   Amanda Tobar MD

## 2020-08-04 NOTE — PROGRESS NOTES
Patient was seen in pediatric sedation suite for bone marrow biopsy and aspirate, please see sedation encounter for full procedure note.    Valentine ZHU-PC  [facility]  Pediatric Blood and Marrow Transplant

## 2020-08-04 NOTE — ANESTHESIA POSTPROCEDURE EVALUATION
Anesthesia POST Procedure Evaluation    Patient: Rommel Murphy   MRN:     [id number] Gender:   male   Age:    20 year old :      2000        Preoperative Diagnosis: Fanconi's anemia (H) [D61.09]   Procedure(s):  Bone marrow biopsy   Postop Comments: No value filed.     Anesthesia Type: General       Disposition: Outpatient   Postop Pain Control: Uneventful            Sign Out: Well controlled pain   PONV: No   Neuro/Psych: Uneventful            Sign Out: Acceptable/Baseline neuro status   Airway/Respiratory: Uneventful            Sign Out: Acceptable/Baseline resp. status   CV/Hemodynamics: Uneventful            Sign Out: Acceptable CV status   Other NRE: NONE   DID A NON-ROUTINE EVENT OCCUR? No         Last Anesthesia Record Vitals:  CRNA VITALS  2020 1130 - 2020 1230      2020             NIBP:  90/40    Pulse:  54    Temp:  36.3  C (97.3  F)    SpO2:  99 %    Resp Rate (observed):  15          Last PACU Vitals:  Vitals Value Taken Time   /61 2020 12:25 PM   Temp     Pulse 54 2020 12:25 PM   Resp 13 2020 12:25 PM   SpO2 98 % 2020 12:25 PM   Temp src     NIBP 90/40 2020 12:06 PM   Pulse 54 2020 12:06 PM   SpO2 99 % 2020 12:06 PM   Resp     Temp 36.3  C (97.3  F) 2020 12:06 PM   Ht Rate     Temp 2           Electronically Signed By: Amanda Tobar MD, 2020, 2:12 PM

## 2020-08-04 NOTE — DISCHARGE INSTRUCTIONS
[facility]  [phone]    Care for Bone Marrow Biopsy    Do not remove bandage/dressing for 24 hours -- after this time they can be removed. If Steri-strips are presents they can stay on until they fall off    No bath, shower or soaking of the dressing for 24 hours    Activity as tolerated by the patient    Diet as able to tolerate    May use Tylenol as needed for pain control    Can apply icepack to the site for discomfort -- no more than 10 minutes at a time    If bleeding presents, apply pressure for 5 minutes    Call [phone] ask for Peds BMT/Hem/Onc fellow on call if complications arise including:     persistent bleeding    fever greater than 100.5    pain       Same-Day Surgery   Adult Discharge Orders & Instructions     For 24 hours after surgery:  1. Get plenty of rest.  A responsible adult must stay with you for at least 24 hours after you leave the hospital.   2. Pain medication can slow your reflexes. Do not drive or use heavy equipment.  If you have weakness or tingling, don't drive or use heavy equipment until this feeling goes away.  3. Mixing alcohol and pain medication can cause dizziness and slow your breathing. It can even be fatal. Do not drink alcohol while taking pain medication.  4. Avoid strenuous or risky activities.  Ask for help when climbing stairs.   5. You may feel lightheaded.  If so, sit for a few minutes before standing.  Have someone help you get up.   6. If you have nausea (feel sick to your stomach), drink only clear liquids such as apple juice, ginger ale, broth or 7-Up.  Rest may also help.  Be sure to drink enough fluids.  Move to a regular diet as you feel able. Take pain medications with a small amount of solid food, such as toast or crackers, to avoid nausea.   7. A slight fever is normal. Call the doctor if your fever is over 100 F (37.7 C) (taken under the tongue) or lasts longer than 24 hours.  8. You may have a dry mouth, muscle aches, trouble sleeping or a  sore throat.  These symptoms should go away after 24 hours.  9. Do not make important or legal decisions.   Pain Management:      1. Take pain medication (if prescribed) for pain as directed by your physician.        2. WARNING: If the pain medication you have been prescribed contains Tylenol  (acetaminophen), DO NOT take additional doses of Tylenol (acetaminophen).     Call your doctor for any of the followin.  Signs of infection (fever, growing tenderness at the surgery site, severe pain, a large amount of drainage or bleeding, foul-smelling drainage, redness, swelling).    2.  It has been over 8 to 10 hours since surgery and you are still not able to urinate (pee).    3.  Headache for over 24 hours.    4.  Numbness, tingling or weakness the day after surgery (if you had spinal anesthesia).  To contact a doctor, call ____[phone]________ or:      [phone] and ask for the Resident On Call for:          __________________________________________ (answered 24 hours a day)      Emergency Department:  [place] [facility]: [phone]  [place] [facility]: [phone]               Rev. 10/2014

## 2020-08-04 NOTE — PROCEDURES
BMT Bone Marrow Biopsy Procedure Note  August 4, 2020 12:07 PM    DIAGNOSIS: Fanconi Anemia    PROCEDURE: Unilateral Bone Marrow Biopsy and Unilateral Aspirate    SITE: Pediatric Sedation Suite    Patient s identification was positively verified by verbal identification and invasive procedure safety checklist was completed.  Informed consent was obtained. Following the administration of propofol as sedation, patient was placed in the  left lateral decubitus position and prepped and draped in a sterile manner.  Approximately 4 cc of 1% Lidocaine was used over the right posterior iliac spine.  Following this a 3 mm incision was made. Trephine bone marrow core was obtained from the Psychiatric. Bone marrow aspirates were obtained from the Psychiatric. Aspirates were sent for morphology, immunophenotyping, cytogenetics and research studies.  A total of approximately 25 ml of marrow was aspirated.  Following this procedure a sterile dressing was applied to the bone marrow biopsy site. The patient was placed in the supine position to maintain pressure on the biopsy site. Post-procedure wound care instructions were given. The patient tolerated the procedure well however did have 7/10 pain following procedure requiring tylenol and oxycodone x1 for pain.   Complications: None    Procedure performed by:   Valentine ZHU-GARY  [facility]  Pediatric Blood and Marrow Transplant

## 2020-08-05 ENCOUNTER — DOCUMENTATION ONLY (OUTPATIENT)
Dept: DENTISTRY | Facility: CLINIC | Age: 20
End: 2020-08-05

## 2020-08-05 ENCOUNTER — ONCOLOGY VISIT (OUTPATIENT)
Dept: TRANSPLANT | Facility: CLINIC | Age: 20
End: 2020-08-05
Attending: PEDIATRICS
Payer: COMMERCIAL

## 2020-08-05 DIAGNOSIS — D61.03 FANCONI'S ANEMIA: Primary | ICD-10-CM

## 2020-08-05 LAB
COPATH REPORT: NORMAL
INHIBIN B SERPL-MCNC: 41 PG/ML
MIS SERPL-MCNC: 18.76 NG/ML (ref 2.08–30.66)

## 2020-08-05 PROCEDURE — G0463 HOSPITAL OUTPT CLINIC VISIT: HCPCS

## 2020-08-05 PROCEDURE — 40000072 ZZH STATISTIC GENETIC COUNSELING, < 16 MIN: Mod: ZF | Performed by: GENETIC COUNSELOR, MS

## 2020-08-05 NOTE — PROGRESS NOTES
August 5, 2020    It was a pleasure meeting with Rommel along with his mother, Davida, in the [facility] to review the genetics and inheritance of FA and to review the option of participating in the International Fanconi Anemia Registry (IFAR). Rommel shared that he does not have any questions about the genetics and inheritance of FA at this time. Rommel did have good questions about the best process for trying to gain weight, COVID-19 recommendations, and questions related to gene therapy. I recommended he review this information with Dr. Stinson during their visit tomorrow.    We reviewed the option of participation in the International Fanconi Anemia Registry (IFAR). After reviewing the risks, benefits, limitations and alternatives of participating in clinical research, Rommel consented to research participation. An electronic copy of the consents were requested by Rommel and he asked that these copies be sent to him and his mother by email. Rommel is aware that no electronic communication can be guaranteed to be secure and was comfortable with this method for sharing files.    The family shared they have my contact information if there are questions or concerns.    Sincerely,    Maria Dolores Huynh MS, [facility]  Certified Genetic Counselor  Pediatric Blood & Marrow Transplant  [phone]  [email or web address]    Approximate time spent in consultation: 15 minutes

## 2020-08-06 ENCOUNTER — ONCOLOGY VISIT (OUTPATIENT)
Dept: TRANSPLANT | Facility: CLINIC | Age: 20
End: 2020-08-06
Attending: PEDIATRICS
Payer: COMMERCIAL

## 2020-08-06 ENCOUNTER — OFFICE VISIT (OUTPATIENT)
Dept: DERMATOLOGY | Facility: CLINIC | Age: 20
End: 2020-08-06
Attending: DERMATOLOGY
Payer: COMMERCIAL

## 2020-08-06 VITALS — WEIGHT: 124.12 LBS | HEIGHT: 68 IN | BODY MASS INDEX: 18.81 KG/M2

## 2020-08-06 VITALS — HEIGHT: 68 IN | BODY MASS INDEX: 18.81 KG/M2 | WEIGHT: 124.12 LBS

## 2020-08-06 DIAGNOSIS — D61.03 FANCONI'S ANEMIA: Primary | ICD-10-CM

## 2020-08-06 DIAGNOSIS — D22.9 MULTIPLE NEVI: ICD-10-CM

## 2020-08-06 DIAGNOSIS — Z12.83 SKIN CANCER SCREENING: ICD-10-CM

## 2020-08-06 LAB
COPATH REPORT: NORMAL
SHBG SERPL-SCNC: 28 NMOL/L (ref 11–80)
TESTOST FREE SERPL-MCNC: 20.88 NG/DL (ref 4.7–24.4)
TESTOST SERPL-MCNC: 829 NG/DL (ref 240–950)

## 2020-08-06 PROCEDURE — G0463 HOSPITAL OUTPT CLINIC VISIT: HCPCS | Mod: ZF

## 2020-08-06 ASSESSMENT — MIFFLIN-ST. JEOR
SCORE: 1541.75
SCORE: 1541.75

## 2020-08-06 ASSESSMENT — PAIN SCALES - GENERAL: PAINLEVEL: NO PAIN (0)

## 2020-08-06 NOTE — NURSING NOTE
"Chief Complaint   Patient presents with     RECHECK     Patient being seen for a 2 year Recall appointment       Ht 1.734 m (5' 8.27\")   Wt 56.3 kg (124 lb 1.9 oz)   BMI 18.72 kg/m      No further vitals taken per Dr. Milad Zavala, LPN  August 6, 2020  "
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
## 2020-08-06 NOTE — PATIENT INSTRUCTIONS
[facility]- Pediatric Dermatology  Dr. Funmilayo Sosa, Dr. Lynne Bautista, Dr. Olivia De Leon, Dr. Bettye Kc & Dr. Nima Wilkinson       Non Urgent  Nurse Triage Line; [phone]- Cami and Kell RN Care Coordinators        If you need a prescription refill, please contact your pharmacy. Refills are approved or denied by our Physicians during normal business hours, Monday through Fridays    Per office policy, refills will not be granted if you have not been seen within the past year (or sooner depending on your child's condition)      Scheduling Information:     Pediatric Appointment Scheduling and Call Center [phone]   Radiology Scheduling- [phone]     Sedation Unit Scheduling- [phone]    [place] Scheduling- [facility] [phone]; Pediatric Dermatology [phone]    Main  Services: [phone]   Chilean: [phone]   Icelandic: [phone]   Hmong/Khmer/Tamazight: [phone]      Preadmission Nursing Department Fax Number: [phone] (Fax all pre-operative paperwork to this number)      For urgent matters arising during evenings, weekends, or holidays that cannot wait for normal business hours please call [phone] and ask for the Dermatology Resident On-Call to be paged.
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
## 2020-08-06 NOTE — NURSING NOTE
"Chief Complaint   Patient presents with     Consult     Patient being seen for BMT skin check.        Ht 5' 8.27\" (173.4 cm)   Wt 124 lb 1.9 oz (56.3 kg)   BMI 18.72 kg/m      Sugar Dumas CMA  August 6, 2020  "

## 2020-08-07 ENCOUNTER — TELEPHONE (OUTPATIENT)
Dept: ENDOCRINOLOGY | Facility: CLINIC | Age: 20
End: 2020-08-07

## 2020-08-07 DIAGNOSIS — E29.1 HYPOGONADISM MALE: Primary | ICD-10-CM

## 2020-08-07 RX ORDER — TESTOSTERONE 25 MG/2.5G
25 GEL TRANSDERMAL DAILY
Qty: 90 PACKET | Refills: 5 | Status: SHIPPED | OUTPATIENT
Start: 2020-08-07

## 2020-08-07 NOTE — TELEPHONE ENCOUNTER
You offered for him to start with normal levels so we should try to get it covered. He had another testosterone check in 2018 that we can use. A letter  saying why he needs this now with the  Normal level will be needed with the last 2 Testosterone results on the letter . Symptoms he may be having or the complications he will have if low  mentioned and why he should start before he is is low . Tiera Crawford RN on 8/7/2020 at 3:09 PM

## 2020-08-07 NOTE — PROGRESS NOTES
2020     Janel Pavon MD  [address]  [address]  [place] [address] [phone] (Work)  [phone] (FAX)  [email or web address]    Christiano Hammond M.D  [address]  [place] [address]  [phone] (Work)  [phone] (Fax)     RE:     Rommel Murphy  MRN#: [id number]  :   2000  JOSE G:   2020     Dear Timothy Pavon and Manish:     As you may know, I saw Rommel today at the [facility] for evaluation of Fanconi anemia and early onset bone marrow failure.   He was last seen by me in .     CHIEF COMPLAINT: Fanconi anemia (FANCA) with evidence of early bone marrow failure; poor weight gain.     HISTORY OF PRESENT ILLNESS:  Fanconi anemia Rommel is 20 year old male diagnosed with Fanconi anemia based on genetic testing after his younger sister was diagnosed. 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  Since the last visit, he has had no problems with infections. He remains active.  Good appetite; no specific complaints except he would like to gain weight and despite increasing calories he has been unsuccessful.  He would like to take steroids or some drug to help gain weight.  He denies bruising, petechiae, rashes, nausea, vomiting, or diarrhea or changes in bowel habits.      PAST MEDICAL HISTORY:   See prior notes.  To date, there have been no PRBC or platelet transfusions.     Surgeries  None    Immunizations:    Up to date by report    Medications:  None  He is taking creatine supplementation to build muscle mass.    Allergies  None known     FAMILY MEDICAL HISTORY:   Mother and father are healthy overall.  Father has high cholesterol and hypertension.  Rommel s sister is 19 years old who had a bone marrow transplant from an HLA matched unrelated donor (2018) for the treatment of Fanconi anemia-related bone marrow failure.       There is no strong family history of cancers involving breast, lung, brain, and leukemia.  There is no family history of Fanconi anemia, or relatives with birth defects or bone marrow failure.   There is no consanguinity.     SOCIAL HISTORY:   Works full time ([employer] and [employer]   No recent travel history.  Lives at home with parents.  Smokes marijuana frequently; uses e-cigarette in place of cigarette smoking because he believes it is safer.  Flavors, number of cartridges per day are unknown.  Alcohol use socially (mostly beer) and on weekends.  Sexually active, using protection most of the time.  Pets (one cat and one dog).     REVIEW OF SYSTEMS:   A 10 point review of systems was obtained.  Pertinent positives and negatives are included in the HPI.       PHYSICAL EXAMINATION:   Vital Signs: /61, HR 54, Temp 98.2. Weight 56.3; height 187.2 (up from 172.1 cm two years ago. Rommel is alert in no acute distress.  HEENT exam is unremarkable; buccal mucosa shows whitish stria on the buccal mucosa with some degree of erythema under the tongue; teeth are in good repair; conjuctivae are pink, sclerae white. The lung exam reveals good air exchange bilaterally; no adventitial sounds. Heart reveals a normal S1 and S2, no S3 or S4; no clicks; no murmurs; pulses are normal throughout with good perfusion distally.  The abdomen is soft and non-tender; there is no evidence of hepatosplenomegaly or masses.  There are good bowel sounds throughout. : phallus normal; circumcised; testicles normal in size and consistency; non tender.  Skin: no rashes; no ecchymoses or petechiae; scattered café aul lait spots with largest one on mid left abdomen; scattered nevi with one on left ear.  Musculoskeletal: thenar eminences, thumbs, radii are normal.     LABORATORY EVALUATION:  WBC 3.5, Hgb 15.5, MCV 98, ,000, ANC 1.3, ALC 1.3, differential normal; no nucleated RBC.  Na 138, K 3.9, Cl 104, CO2 27, BUN 17, creat 1.11, Ca  9.5, alb 4.5, TP 8.3, TB 0.9, alk Phos 79, ALT 31, AST 22, GGT 67, HgbA1C 4.7, cholesterol 187, HDL 71, LDL 92, non , , vit D 37, T4 0.88, TSH 1.27, testosterone 829, inhibin B 41 (normal ), anti Mullerian hormone 18.764, LH 4.2, FSH 22.3 (normal 0.7-10.8). SARS CoV 2 negative.  Dexascan normal for age.  Bone age >19 years.  Brain MRI: no structural abnormalities. Audiology result pending.    BMA/BX: Hypocellular marrow (30%) with trilineage hematopoiesis with no significant dysplasia or increase in blasts; peripheral blood shows slight leukopenia.  Flow: No aberrant immunophenotype of the myeloid blasts.  FISH/cytogenetics are pending.       ASSESSMENT  1) Fanconi anemia with early evidence of bone marrow abnormalities. BM reveals decreased cellularity and peripheral blood reveals leukopenia.  He should have annual marrow exams, including karyotyping and FISH for common chromosomal aberrations, and every 3 month CBCs.  I again reviewed the characteristic cytogenetic changes that might precede leukemia or MDS, most notably in chromosomes 1, 3, 4, 7,8, and 11.  If needed, he should receive CMV negative, leukodepleted, irradiated blood products unless we know his is CMV positive (all products should be irradiated). When possible, avoid the use of drugs with marrow suppressive side effects; he should be reminded to review every medication with his pharmacist or provider should any drug be prescribed for infection or other reason.  As detailed below, considerable time was spent reviewing the [facility] sponsored gene therapy protocol which should open here in October/November 2020.   2) Poor weight gain: Rommel was going to discuss with Endocrinology..  3) Benign appearing nevi, cafe au lait macules: followed by Dermatology which will occur annually.  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  4) Infertility Risk.  We discussed the  high risk of infertility in males with FA.  Again discussed arranging for a semen analysis at home.  I encouraged him to use condoms as we haven t proven that he is sterile (in addition to the protection for infections).  He states he uses protection most of the time.  5) Elevated non-HDL cholesterol: monitor    6) Cancer Risk - FA patients have a higher cancer risk compared to the general population, particularly of the head and neck, esophagus, perianal region and skin.  However other cancers also occur at higher frequency.  We recommend surveillance for all FA patients.  Specifically, we recommend every 6 month dental evaluations with good evaluation of the oral cavity, annual ENT evaluations with direct laryngoscopy by an endoscope (not dental mirror) to look at the upper larynx and esophagus routinely, annual perianal/rectal exam, and annual dermatology whole body evaluations.  As this is a DNA repair defect, xrays should be used judiciously.  He was also instructed on the importance of avoiding alcohol, vaping and cigarette smoking.  There is emerging evidence on aberrant aldehyde metabolism in FA patients.      GENERAL COUNSELING  FA HSC gene therapy.  I reviewed the most recent updated results as presented by the group in [facility].  I reviewed the protocol design and the follow requirements.  The study is currently restricted to patients >1 year with reasonably good blood counts and absence of MDS, leukemia or cytogenetic abnormality. There is evidence of stabilization in most or improvement in two patients. However, there are patients in who there is no evidence of gene corrected cells; however, these tended to be in more advanced patients likely with few HSC for gene correction.  We discussed the possibility of still needed an allogeneic bone marrow transplant but this study offered him the possibility of never needed transplant.  I also discussed other potential downsides, like be unable to receive next  generation gene therapy trials under development.  Regardless, the plan is to notify him once the gene therapy trial opens as he has a good chance of meeting the eligibility criteria presuming his cytogenetic evaluation is currently normal. If he is interested, we will schedule another visit once the trial opens.  He will need another bone marrow aspirate and workup specific to the trial.  Study procedures and follow up would be covered by the sponsor.  Dr. Sawyer is the PI of the trial here and he would meet with her on that visit.         Sincerely yours,        Jeovanny Stinson M.D.   Professor of Pediatrics   Phone:  [phone]    Total face to face time: 1.5 hours (>50% counseling)    ADDENDUM  Bone marrow:     - Hypocellular marrow for age (30%) with trilineage hematopoiesis    - No increase in blasts and no overt dysplasia    - Slightly increased eosinophils and mast cells, see comment    - Peripheral blood showing slight leukopenia with neutropenia    Flow cytometric immunophenotyping:    - No increase in myeloid blasts and no abnormal myeloid blast population.

## 2020-08-07 NOTE — TELEPHONE ENCOUNTER
I spoke with Rommel on lab results. He would like to start a low dose testosterone gel script. Because his testosterone level is normal  Insurance may ask  to see for 2 AM draws ( before 10 AM )  to make sure and will want a  real good explanation as to why he would benefit  from the testosterone .Tiera Crawford, RN on 8/7/2020 at 1:56 PM

## 2020-08-07 NOTE — RESULT ENCOUNTER NOTE
Slight high FSH level, but good testoserone level, if you are interested in, we can try very small dose of testosreon supplement gel.   Let us know.   Loren Escobar MD

## 2020-08-07 NOTE — TELEPHONE ENCOUNTER
----- Message from Loren Escobar MD sent at 8/7/2020 12:47 PM CDT -----  Slight high FSH level, but good testoserone level, if you are interested in, we can try very small dose of testosreon supplement gel.   Let us know.   Loren Escobar MD

## 2020-08-10 LAB
COPATH REPORT: NORMAL
DEPRECATED CALCIDIOL+CALCIFEROL SERPL-MC: <50 UG/L (ref 20–75)
VITAMIN D2 SERPL-MCNC: <5 UG/L
VITAMIN D3 SERPL-MCNC: 45 UG/L

## 2020-08-25 LAB — COPATH REPORT: NORMAL

## 2020-12-23 NOTE — PROGRESS NOTES
[facility]  Oral Pathology Clinic  [address] [address]  [phone]      Re: Rommel Murphy  SOD: [id number]  : 2000  JOSE G: 2020      S: Rommel is a 20-year-old male seen today with his mother and sister by oral pathology in the [facility], [facility].  This is his first visit to this clinic. Diagnosed with Fanconi Anemia in 2018.  No BMT. Followed by Dr. Jeovanny Stinson. Established dental home, Dr. Pyle, in [place] and last seen approximately 2 years ago. Generally healthy this past year.  Today he denies pain, burning, numbness, sores, or lumps or bumps in his oral cavity.      O:   Thorough examination of the oral cavity, lips and tongue performed. Areas noted upon exam include:    1) Bilateral shaggy white areas buccal mucosa consistent with trauma  All other aspects of the floor of mouth, tongue, buccal and labial mucosa, and gingivae are normal in appearance.        A:  1) Bilateral Frictional hyperkeratosis of buccal mucosa (cheek chewing). Normal findings.     P:  Rommel is overdue for a teeth cleaning and examination. Recommend scheduling R6 with general dentist.   He will be following up with Dr. Jeovanny Stinson yearly and another evaluation of his oral cavity will be performed at that time in this clinic. In the meantime, Rommel is encouraged to perform oral cavity examinations on a monthly basis.  Education was provided on self-examinations to detect abnormalities including, sores that do not heal, lumps, bumps, and red or white patches and importance of good oral hygiene. Recommendations are to schedule a six month recall appointment with general dentistry,  call clinic if any concerns with oral cavity, and return to oral pathology clinic in one year.       Dr. Catarina Frod, BDS, PhD  Dictated: Samina Guevara, JEFFERYN, RN, PHN, LDA

## 2021-08-23 ENCOUNTER — TELEPHONE (OUTPATIENT)
Dept: TRANSPLANT | Facility: CLINIC | Age: 21
End: 2021-08-23
Payer: COMMERCIAL

## 2021-08-23 NOTE — TELEPHONE ENCOUNTER
----- Message from Destiny Serna, RN sent at 8/2/2021  3:56 PM CDT -----  Regarding: RE: October BAN  Diane Hancock re-activated him to have finance check his insurance. He's had some issues in the past. When we get approval, the following will be needed:     COVID testing  Dr. Stinson  Oral Path  [facility] - [facility]  ENT - [place]ia  Derm - Prosper Flowers - Shawn     Fasting Labs  Bone marrow biopsy  EGD    [place]

## 2021-08-23 NOTE — LETTER
DATE: 8/23/2021  TO: Rommel Murphy  FROM:  [facility] Blood and Marrow Transplant [facility]     Good Morning,    My name is Shauna, your complex  for the [facility]. I hope this note finds you well. It is time to look at scheduling Fl[facility] follow-up appointments. I have been asked by Dr. Stinson and Fl[place], nurse coordinator, to schedule the following  appointments:    CONSULTATIONS  Dr. Stinson  Oral Path  GI - Dr. Copeland  ENT -   Derm -   Endo - Dr. Escobar     TESTS/PROCEDURES  COVID Testing  Fasting Labs  Bone Marrow Biopsy    Looking at providers scheduling, the dates of *** or *** are available.  Please let me know what will work best for you or any dates you would prefer. I have included [facility] in this email in case you have questions.     Your final calendar will be sent by a secured email, and once you receive it, it is only accessible for 90 days.     One last detail to go over. Have you had any recent changes to address, phone number, or insurance that we need to update in the chart? If there is, just let me know, and I will get that updated for you.    If you have any questions regarding this appointment, please call me direct at:  [phone] or toll free at [phone].    Sincerely,  Shauna Arriaga  BMT Procedure

## 2022-07-28 ENCOUNTER — TELEPHONE (OUTPATIENT)
Dept: TRANSPLANT | Facility: CLINIC | Age: 22
End: 2022-07-28
Payer: COMMERCIAL

## 2022-07-28 DIAGNOSIS — D61.03 FANCONI'S ANEMIA: Primary | ICD-10-CM

## 2022-07-28 NOTE — TELEPHONE ENCOUNTER
Writer sent the following email to patient;    Mihir Carney,    My name is Maile, I am Dr. Stinson s new nurse coordinator at the [facility].  I am reaching out to see if you are interested in coming up to [place] to see some of our FA specialists.    If you are interested in getting any appointments scheduled, I am happy to help.     Thanks!  Maile Villeda, RN, BSN  Pediatric BMT Nurse Coordinator  [facility]  Blood and Marrow Transplant Program  [address] [place] [id number]  [email or web address]  Office: [phone]  Fax: [phone]  Employed by [facility]

## 2023-07-07 ENCOUNTER — TELEPHONE (OUTPATIENT)
Dept: TRANSPLANT | Facility: CLINIC | Age: 23
End: 2023-07-07
Payer: COMMERCIAL

## 2023-07-07 NOTE — TELEPHONE ENCOUNTER
Phone call to patient regarding research opportunity at the [facility]. Contact information provided via voicemail. Instructed patient to call back at [phone]  if he's interested in discussing the research opportunity.

## 2023-08-17 ENCOUNTER — TELEPHONE (OUTPATIENT)
Dept: TRANSPLANT | Facility: CLINIC | Age: 23
End: 2023-08-17
Payer: COMMERCIAL

## 2023-08-17 NOTE — TELEPHONE ENCOUNTER
The following email was sent to the patient:     Mihir Carney,     I'm the nurse coordinator who works with Dr. Stinson at the [facility]. I'm contacting you to see if you are interested in returning to [place] to see Dr. Stinson or any other specialists. Please each out with any questions or if I can assist with setting up any appointments!    Thanks,   Toni Roy RN  Pediatric Blood and Marrow Nurse Coordinator  [facility]'[address]  [place] [address]  Office: [phone]  Fax: [phone]  Pager: [phone]  [email or web address]

## 2023-11-15 NOTE — PATIENT INSTRUCTIONS
11/15/23 1023   Activity/Group Therapy Checklist   Group Other (Comments)  (Strengths Exercise)   Attendance Attended   Follows Direction Followed directions   Group Interactions/Observations Interacted appropriately;Alert;Sharing;Supportive   Affect/Mood Range Normal range   Affect/Mood Display Appropriate   Goal Progression Progressing      facilitated group session. SW discussed strengths exploration exercise. SW discussed with patients the importance of identifying strengths and using them effectively. SW discussed identifying Factors that contribute to stress and factors protect against stress.    No follow up instructions as of 10/2/2018 at 9:05am JANINA

## 2024-02-22 ENCOUNTER — TELEPHONE (OUTPATIENT)
Dept: TRANSPLANT | Facility: CLINIC | Age: 24
End: 2024-02-22
Payer: COMMERCIAL

## 2024-02-22 NOTE — TELEPHONE ENCOUNTER
Phone call with patient to discuss research trial eligibility. Patient expressed interest in participating [facility] and inquired about gene therapy trial updates. Writer discussed general overview of open research trials and patient inquired as to next steps for participation. Writer recommended having CBC checked per  clinical guidelines and that these results would help to determine eligibility for open research trials. Patient also expressed interest in reviewing available information regarding the [employer] trial, a current consent form was sent via e-mail as patient does not currently have access to [employer]. Patient verbalized understanding of the plan and stated that he would have a CBC drawn locally and would share those results once available. Contact information provided to patient for further questions.

## (undated) DEVICE — GLOVE PROTEXIS W/NEU-THERA 6.5  2D73TE65

## (undated) DEVICE — PREP DURAPREP 26ML APL 8630

## (undated) DEVICE — DRSG PRIMAPORE 02X3" 7133

## (undated) DEVICE — PAD CHUX UNDERPAD 30X36" P3036C

## (undated) DEVICE — TAPE MICROFOAM 3" 1528-3

## (undated) DEVICE — DRSG GAUZE 4X8" NON21842

## (undated) DEVICE — GLOVE PROTEXIS W/NEU-THERA 7.5  2D73TE75

## (undated) DEVICE — DRSG PRIMAPORE 03 1/8X6" 66000318

## (undated) DEVICE — BLADE KNIFE SURG 11 371111

## (undated) RX ORDER — ONDANSETRON 2 MG/ML
INJECTION INTRAMUSCULAR; INTRAVENOUS
Status: DISPENSED
Start: 2020-08-04

## (undated) RX ORDER — DEXAMETHASONE SODIUM PHOSPHATE 4 MG/ML
INJECTION, SOLUTION INTRA-ARTICULAR; INTRALESIONAL; INTRAMUSCULAR; INTRAVENOUS; SOFT TISSUE
Status: DISPENSED
Start: 2018-10-01

## (undated) RX ORDER — ONDANSETRON 2 MG/ML
INJECTION INTRAMUSCULAR; INTRAVENOUS
Status: DISPENSED
Start: 2018-10-01

## (undated) RX ORDER — FENTANYL CITRATE 50 UG/ML
INJECTION, SOLUTION INTRAMUSCULAR; INTRAVENOUS
Status: DISPENSED
Start: 2020-08-04

## (undated) RX ORDER — ACETAMINOPHEN 325 MG/1
TABLET ORAL
Status: DISPENSED
Start: 2018-10-01

## (undated) RX ORDER — LIDOCAINE HYDROCHLORIDE 20 MG/ML
INJECTION, SOLUTION EPIDURAL; INFILTRATION; INTRACAUDAL; PERINEURAL
Status: DISPENSED
Start: 2018-10-01

## (undated) RX ORDER — LIDOCAINE HYDROCHLORIDE 20 MG/ML
INJECTION, SOLUTION EPIDURAL; INFILTRATION; INTRACAUDAL; PERINEURAL
Status: DISPENSED
Start: 2020-08-04

## (undated) RX ORDER — PROPOFOL 10 MG/ML
INJECTION, EMULSION INTRAVENOUS
Status: DISPENSED
Start: 2020-08-04

## (undated) RX ORDER — PROPOFOL 10 MG/ML
INJECTION, EMULSION INTRAVENOUS
Status: DISPENSED
Start: 2018-10-01

## (undated) RX ORDER — FENTANYL CITRATE 50 UG/ML
INJECTION, SOLUTION INTRAMUSCULAR; INTRAVENOUS
Status: DISPENSED
Start: 2018-10-01

## (undated) RX ORDER — GLYCOPYRROLATE 0.2 MG/ML
INJECTION INTRAMUSCULAR; INTRAVENOUS
Status: DISPENSED
Start: 2020-08-04